# Patient Record
Sex: FEMALE | Race: WHITE | Employment: UNEMPLOYED | URBAN - METROPOLITAN AREA
[De-identification: names, ages, dates, MRNs, and addresses within clinical notes are randomized per-mention and may not be internally consistent; named-entity substitution may affect disease eponyms.]

---

## 2018-09-20 ENCOUNTER — HOSPITAL ENCOUNTER (OUTPATIENT)
Dept: CARDIAC CATH/INVASIVE PROCEDURES | Age: 58
Discharge: HOME | End: 2018-09-20
Attending: INTERNAL MEDICINE | Admitting: INTERNAL MEDICINE
Payer: COMMERCIAL

## 2018-09-20 VITALS
OXYGEN SATURATION: 96 % | WEIGHT: 105 LBS | BODY MASS INDEX: 19.83 KG/M2 | RESPIRATION RATE: 18 BRPM | DIASTOLIC BLOOD PRESSURE: 61 MMHG | TEMPERATURE: 98.1 F | HEART RATE: 73 BPM | SYSTOLIC BLOOD PRESSURE: 112 MMHG | HEIGHT: 61 IN

## 2018-09-20 LAB
ANION GAP SERPL CALC-SCNC: 8 MMOL/L (ref 7–16)
ATRIAL RATE: 85 BPM
BUN SERPL-MCNC: 11 MG/DL (ref 6–23)
CALCIUM SERPL-MCNC: 9.4 MG/DL (ref 8.3–10.4)
CALCULATED P AXIS, ECG09: 54 DEGREES
CALCULATED R AXIS, ECG10: 19 DEGREES
CALCULATED T AXIS, ECG11: 26 DEGREES
CHLORIDE SERPL-SCNC: 104 MMOL/L (ref 98–107)
CO2 SERPL-SCNC: 29 MMOL/L (ref 21–32)
CREAT SERPL-MCNC: 0.57 MG/DL (ref 0.6–1)
DIAGNOSIS, 93000: NORMAL
ERYTHROCYTE [DISTWIDTH] IN BLOOD BY AUTOMATED COUNT: 12.6 %
GLUCOSE SERPL-MCNC: 160 MG/DL (ref 65–100)
HCT VFR BLD AUTO: 47.1 % (ref 35.8–46.3)
HGB BLD-MCNC: 16.3 G/DL (ref 11.7–15.4)
INR PPP: 1.1
MAGNESIUM SERPL-MCNC: 1.8 MG/DL (ref 1.8–2.4)
MCH RBC QN AUTO: 31.2 PG (ref 26.1–32.9)
MCHC RBC AUTO-ENTMCNC: 34.6 G/DL (ref 31.4–35)
MCV RBC AUTO: 90.2 FL (ref 79.6–97.8)
NRBC # BLD: 0 K/UL (ref 0–0.2)
P-R INTERVAL, ECG05: 152 MS
PLATELET # BLD AUTO: 196 K/UL (ref 150–450)
PMV BLD AUTO: 11.2 FL (ref 9.4–12.3)
POTASSIUM SERPL-SCNC: 3.6 MMOL/L (ref 3.5–5.1)
PROTHROMBIN TIME: 13.4 SEC (ref 11.5–14.5)
Q-T INTERVAL, ECG07: 358 MS
QRS DURATION, ECG06: 74 MS
QTC CALCULATION (BEZET), ECG08: 426 MS
RBC # BLD AUTO: 5.22 M/UL (ref 4.05–5.2)
SODIUM SERPL-SCNC: 141 MMOL/L (ref 136–145)
VENTRICULAR RATE, ECG03: 85 BPM
WBC # BLD AUTO: 8.3 K/UL (ref 4.3–11.1)

## 2018-09-20 PROCEDURE — 99152 MOD SED SAME PHYS/QHP 5/>YRS: CPT

## 2018-09-20 PROCEDURE — 77030019569 HC BND COMPR RAD TERU -B

## 2018-09-20 PROCEDURE — 80048 BASIC METABOLIC PNL TOTAL CA: CPT

## 2018-09-20 PROCEDURE — 99153 MOD SED SAME PHYS/QHP EA: CPT

## 2018-09-20 PROCEDURE — 74011000258 HC RX REV CODE- 258: Performed by: INTERNAL MEDICINE

## 2018-09-20 PROCEDURE — C1887 CATHETER, GUIDING: HCPCS

## 2018-09-20 PROCEDURE — 74011636320 HC RX REV CODE- 636/320: Performed by: INTERNAL MEDICINE

## 2018-09-20 PROCEDURE — 74011250636 HC RX REV CODE- 250/636: Performed by: INTERNAL MEDICINE

## 2018-09-20 PROCEDURE — 93005 ELECTROCARDIOGRAM TRACING: CPT | Performed by: INTERNAL MEDICINE

## 2018-09-20 PROCEDURE — 85610 PROTHROMBIN TIME: CPT

## 2018-09-20 PROCEDURE — 77030004534 HC CATH ANGI DX INFN CARD -A

## 2018-09-20 PROCEDURE — 36252 INS CATH REN ART 1ST BILAT: CPT

## 2018-09-20 PROCEDURE — C1894 INTRO/SHEATH, NON-LASER: HCPCS

## 2018-09-20 PROCEDURE — C1769 GUIDE WIRE: HCPCS

## 2018-09-20 PROCEDURE — 93571 IV DOP VEL&/PRESS C FLO 1ST: CPT

## 2018-09-20 PROCEDURE — 85027 COMPLETE CBC AUTOMATED: CPT

## 2018-09-20 PROCEDURE — 83735 ASSAY OF MAGNESIUM: CPT

## 2018-09-20 PROCEDURE — 93458 L HRT ARTERY/VENTRICLE ANGIO: CPT

## 2018-09-20 PROCEDURE — 77030015766

## 2018-09-20 PROCEDURE — 74011250636 HC RX REV CODE- 250/636

## 2018-09-20 PROCEDURE — 74011000250 HC RX REV CODE- 250: Performed by: INTERNAL MEDICINE

## 2018-09-20 PROCEDURE — 74011250637 HC RX REV CODE- 250/637: Performed by: INTERNAL MEDICINE

## 2018-09-20 PROCEDURE — 77030012468 HC VLV BLEEDBK CNTRL ABBT -B

## 2018-09-20 RX ORDER — MIDAZOLAM HYDROCHLORIDE 1 MG/ML
.5-5 INJECTION, SOLUTION INTRAMUSCULAR; INTRAVENOUS
Status: DISCONTINUED | OUTPATIENT
Start: 2018-09-20 | End: 2018-09-20 | Stop reason: HOSPADM

## 2018-09-20 RX ORDER — DIAZEPAM 5 MG/1
5 TABLET ORAL ONCE
Status: COMPLETED | OUTPATIENT
Start: 2018-09-20 | End: 2018-09-20

## 2018-09-20 RX ORDER — LIDOCAINE HYDROCHLORIDE 10 MG/ML
5-40 INJECTION INFILTRATION; PERINEURAL
Status: DISCONTINUED | OUTPATIENT
Start: 2018-09-20 | End: 2018-09-20 | Stop reason: HOSPADM

## 2018-09-20 RX ORDER — METOPROLOL TARTRATE 5 MG/5ML
2.5-5 INJECTION INTRAVENOUS
Status: DISCONTINUED | OUTPATIENT
Start: 2018-09-20 | End: 2018-09-20 | Stop reason: HOSPADM

## 2018-09-20 RX ORDER — AMLODIPINE BESYLATE 10 MG/1
10 TABLET ORAL DAILY
Qty: 90 TAB | Refills: 3 | Status: SHIPPED | OUTPATIENT
Start: 2018-09-20 | End: 2022-01-05 | Stop reason: SDUPTHER

## 2018-09-20 RX ORDER — SODIUM CHLORIDE 9 MG/ML
75 INJECTION, SOLUTION INTRAVENOUS CONTINUOUS
Status: DISCONTINUED | OUTPATIENT
Start: 2018-09-20 | End: 2018-09-20 | Stop reason: HOSPADM

## 2018-09-20 RX ORDER — HEPARIN SODIUM 200 [USP'U]/100ML
3 INJECTION, SOLUTION INTRAVENOUS CONTINUOUS
Status: DISCONTINUED | OUTPATIENT
Start: 2018-09-20 | End: 2018-09-20 | Stop reason: HOSPADM

## 2018-09-20 RX ORDER — SODIUM CHLORIDE 0.9 % (FLUSH) 0.9 %
5-10 SYRINGE (ML) INJECTION AS NEEDED
Status: DISCONTINUED | OUTPATIENT
Start: 2018-09-20 | End: 2018-09-20 | Stop reason: HOSPADM

## 2018-09-20 RX ORDER — FENTANYL CITRATE 50 UG/ML
25-100 INJECTION, SOLUTION INTRAMUSCULAR; INTRAVENOUS
Status: DISCONTINUED | OUTPATIENT
Start: 2018-09-20 | End: 2018-09-20 | Stop reason: HOSPADM

## 2018-09-20 RX ORDER — GUAIFENESIN 100 MG/5ML
324 LIQUID (ML) ORAL ONCE
Status: DISCONTINUED | OUTPATIENT
Start: 2018-09-20 | End: 2018-09-20 | Stop reason: HOSPADM

## 2018-09-20 RX ORDER — SODIUM CHLORIDE 0.9 % (FLUSH) 0.9 %
5-10 SYRINGE (ML) INJECTION EVERY 8 HOURS
Status: DISCONTINUED | OUTPATIENT
Start: 2018-09-20 | End: 2018-09-20 | Stop reason: HOSPADM

## 2018-09-20 RX ORDER — SODIUM CHLORIDE 9 MG/ML
250 INJECTION, SOLUTION INTRAVENOUS CONTINUOUS
Status: DISCONTINUED | OUTPATIENT
Start: 2018-09-20 | End: 2018-09-20 | Stop reason: HOSPADM

## 2018-09-20 RX ADMIN — LIDOCAINE HYDROCHLORIDE 5 ML: 10 INJECTION, SOLUTION INFILTRATION; PERINEURAL at 17:05

## 2018-09-20 RX ADMIN — MIDAZOLAM HYDROCHLORIDE 1 MG: 1 INJECTION, SOLUTION INTRAMUSCULAR; INTRAVENOUS at 16:52

## 2018-09-20 RX ADMIN — HEPARIN SODIUM 2 ML: 10000 INJECTION, SOLUTION INTRAVENOUS; SUBCUTANEOUS at 17:08

## 2018-09-20 RX ADMIN — SODIUM CHLORIDE 75 ML/HR: 900 INJECTION, SOLUTION INTRAVENOUS at 14:08

## 2018-09-20 RX ADMIN — DIAZEPAM 5 MG: 5 TABLET ORAL at 14:08

## 2018-09-20 RX ADMIN — METOROPROLOL TARTRATE 5 MG: 5 INJECTION, SOLUTION INTRAVENOUS at 16:56

## 2018-09-20 RX ADMIN — IOPAMIDOL 114 ML: 755 INJECTION, SOLUTION INTRAVENOUS at 17:14

## 2018-09-20 RX ADMIN — HEPARIN SODIUM 3 ML/HR: 5000 INJECTION, SOLUTION INTRAVENOUS; SUBCUTANEOUS at 17:05

## 2018-09-20 RX ADMIN — FENTANYL CITRATE 25 MCG: 50 INJECTION, SOLUTION INTRAMUSCULAR; INTRAVENOUS at 16:52

## 2018-09-20 RX ADMIN — BIVALIRUDIN 1.75 MG/KG/HR: 250 INJECTION, POWDER, LYOPHILIZED, FOR SOLUTION INTRAVENOUS at 17:02

## 2018-09-20 NOTE — PROCEDURES
Brief Cardiac Procedure Note    Patient: Casandra Hoyt MRN: [id number]  SSN: [id number]    YOB: 1960  Age: 56 y.o. Sex: female      Date of Procedure: 9/20/2018     Pre-procedure Diagnosis: Atypical Angina    Post-procedure Diagnosis: Coronary Artery Disease    Procedure: Left Heart Catheterization    Brief Description of Procedure: See note    Performed By: Christiano Mata MD     Assistants: None    Anesthesia: Moderate Sedation    Estimated Blood Loss: Less than 10 mL      Specimens: None    Implants: None    Findings:   LV:  EF 65%  LM:  NML  LAD:  Widely patent stent, 30-40% D1  LCx:  Stent patent with 30-50% at prx and distal edge  RCA:  Long 20-30% mid    iFR LCx 0.97    Bilateral renal injection - mild focal FMD main right, left main normal, left inferior 68-45%,     Complications: None    Recommendations: Continue medical therapy.     Signed By: Christiano Mata MD     September 20, 2018

## 2018-09-20 NOTE — PROGRESS NOTES
Called to pre-assess for C poss with DR Adia Oswald , Scheduled 9/20/18.  No answer & mailbox not set up, unable to leave message

## 2018-09-20 NOTE — PROGRESS NOTES
Report received from [facility] [address]. Procedural findings communicated. Intra procedural  medication administration reviewed. Progression of care discussed.      Patient received into [address] post sheath removal.     Right Radial access site without bleeding or swelling     TR band dry and intact     Patient instructed to limit movement to right upper extremity    Routine post procedural vital signs and site assessment initiated

## 2018-09-20 NOTE — PROCEDURES
[address] [facility] Jonathan Warner  MR#: [id number]  : 1960  ACCOUNT #: [de-identified]   DATE OF SERVICE: 2018    PRIMARY CARDIOLOGIST:  Austin Hartman MD    PRIMARY CARE PHYSICIAN:  Cece Warren DO    BRIEF HISTORY:  The patient is a 81-year-old female with extensive tobacco abuse, established atherosclerotic coronary artery disease, prior PCI and stenting of the LAD and left circumflex, who presents now with worsening symptoms consistent with angina despite medical management and referred for cardiac catheterization. PROCEDURE:  After informed consent, she was prepped and draped in the usual sterile fashion. The right wrist was infiltrated with lidocaine. The right radial artery was accessed and a 6-Welsh sheath was advanced. A 5-Welsh Tiger catheter was utilized for left and right coronary injections and a 5-Welsh angled pigtail for left ventriculography. A 5-Welsh multipurpose catheter for bilateral selective renal angiography. A 6-Welsh XB 3.0 guide was utilized for iFR in the left circumflex. CONTRAST:  Isovue. CONSCIOUS SEDATION:  Start time 18, end time 26. Medications:  1 mg of Versed and 25 mcg of fentanyl. MONITORING RN:  Nadir Ugarte. FINDINGS:  1. Left ventricle:  Normal left ventricular size, normal left ventricular systolic function, ejection fraction is 65%. There is no significant mitral regurgitation. There is no aortic valve gradient. Left ventricular end-diastolic pressure measured at 10 mmHg. 2.  Left main:  Left main is large, bifurcates into the LAD and circumflex systems, and appears angiographically normal.  3.  Left anterior descending coronary artery: This is a large vessel. The stent in the mid portion is widely patent. There is no other significant disease within the LAD. There is a small first diagonal which has diffuse 30-40% stenosis. 4.  Left circumflex coronary artery:   This is a moderate-sized vessel. It gives rise to a very small first obtuse marginal that has 30-40% ostial stenosis. The stent is a very short stent. It is caught in a highly mobile section of the mid left circumflex. There is a 30-40% lesion at the proximal stent edge and a 40-50% lesion at the distal stent edge. The terminal obtuse marginal appears normal.  5.  Right coronary artery: This is a moderate-sized anatomically dominant vessel. It has a long area of 30% stenosis throughout the mid. It becomes quite large and gives rise to a large posterior descending and posterolateral branch that appear normal.    IFR:    LESION:  Left circumflex. RESULT:  0.97. DETAILS:  The patient was anticoagulated with Angiomax. A 6-S[place] Arabian XB 3.0 guide was utilized. An iFR wire was advanced to the distal edge of the guide, normalized, and advanced distal to the lesion. IFR was measured at 0.97 and 1.0, all consistent with non-hemodynamically significant stenosis which is best managed medically. BILATERAL RENAL ANGIOGRAPHY:   1. Right renal artery: This exists as 1 large renal artery. There is a small superior branch that comes off near the aorta ostium. The mid portion of the main vessel has mild focal fibromuscular dysplastic changes with normal flow. 2.  Left renal artery:  There is a single superior renal artery which supplies the vast majority of the left renal, which appears angiographically normal.  There is a smaller separate inferior renal artery supplying the inferior pole of the left kidney that has mild 20-30% stenosis. Successful hemostasis with a pneumatic radial band. CONCLUSIONS:  1. Normal left ventricular systolic function with normal filling pressures. 2.  Severe, poorly controlled chronic hypertension with baseline pressure of 178/114, was administered 10 mg of IV Lopressor with marked improvement in blood pressure.   3.  Stable atherosclerotic coronary artery disease with widely patent LAD stenting, mild nonobstructive disease involving the small first diagonal.  4.  Patent left circumflex stenting with mild-to-moderate disease on proximal and distal stent edge with iFR of 0.97 and 1.0.  5.  Long area of mild nonobstructive disease of the mid right coronary artery consistent with heart catheterization from 3 years ago. 6.  No evidence of significant renal artery stenosis or fibromuscular dysplasia. Thank you for allowing us to participate in the care of this patient. If you have any questions or concerns, please feel free to contact me.       MD AKIL Aguilera / DN  D: 09/20/2018 17:26     T: 09/20/2018 17:56  JOB #: [id number]  CC: Kirti Price MD  [facility] CARDIOLOGY [address] [place] [address] Dr Robbin Alvarez, [address] [facility]  CC[facility] [address]  [address]   [address]

## 2018-09-20 NOTE — PROGRESS NOTES
TRANSFER - OUT REPORT:    Verbal report given to Dhaval Wright RN(name) on Ese Earl  being transferred to cpru(unit) for routine progression of care       Report consisted of patients Situation, Background, Assessment and   Recommendations(SBAR). Information from the following report(s) SBAR was reviewed with the receiving nurse.    has No Known Allergies. Opportunity for questions and clarification was provided. Procedure Summary:Pt had LHC with iFR of circ via R wrist. Site sealed with R band using 10 ml at 1717 hrs.   Med Administration    Versed:  1 mg  Fentanyl: 25 mcg    Angiomax Stop Time: 2493    Visit Vitals    /71 (BP 1 Location: Left arm, BP Patient Position: Supine)    Pulse 73    Temp 98.1 °F (36.7 °C)    Resp 18    Ht 5' 1\" (1.549 m)    Wt 47.6 kg (105 lb)    SpO2 96%    BMI 19.84 kg/m2     Past Medical History:   Diagnosis Date    Atherosclerosis of native coronary artery without angina pectoris 5/13/2016    Bilateral carotid artery occlusion 5/13/2016    CAD (coronary artery disease) 3/27/2015    Carotid artery stenosis without cerebral infarction 5/13/2016    Chest pain, unspecified 5/13/2016    Chronic obstructive pulmonary disease (HCC)     Coronary atherosclerosis of native coronary vessel 7/25/2016    Depression (emotion)     Diabetes ([address]     ARANDA (dyspnea on exertion)     Dyspnea 5/13/2016    GERD (gastroesophageal reflux disease)     Hypertension     Mixed hyperlipidemia 5/13/2016    Occlusion and stenosis of bilateral carotid arteries 7/25/2016    Peripheral vascular disease ([address] 5/13/2016    SOB (shortness of breath)     Tobacco dependence            Peripheral IV 09/20/18 Right Antecubital (Active)       Peripheral IV 09/20/18 Left Antecubital (Active)

## 2018-09-20 NOTE — PROGRESS NOTES
Pt arrived, ambulated to room with no visible problems, planned C for Dr Solange Davis. Consent signed, Procedure discussed with pt all questions answered voiced understanding. Medications and history discussed with pt. Pt prepped per ordersThe patient has a fraility score of 3-MANAGING WELL, based on ability to complete ADLs without assistance, increased symptoms on exertion.       Patient took Aspirin 324mg today at 0900 prior to arrival.  Pt took 75mg Plavix at 1400

## 2018-09-20 NOTE — PROGRESS NOTES
Patient up to bedside, vital signs and site stable. Patient ambulated to bathroom without difficulty. Patient voided without difficulty. Vascular site stable. 1845 Discharge instructions and home medications reviewed with patient. Time allowed for questions and answers. 1900 Patient ambulated second time without difficulty. Site stable after ambulation. Peripheral IV sites dc'd without difficulty with tips intact. 1915 Patient discharged to home with family.

## 2018-09-20 NOTE — DISCHARGE INSTRUCTIONS

## 2018-09-20 NOTE — PROGRESS NOTES
TRANSFER - IN REPORT:    Verbal report received from [place], RN(name) on Lorelei Ugarte  being received from cath lab(unit) for routine progression of care      Report consisted of patients Situation, Background, Assessment and   Recommendations(SBAR). Information from the following report(s) Procedure Summary was reviewed with the receiving nurse. Opportunity for questions and clarification was provided. Assessment completed upon patients arrival to unit and care assumed.

## 2018-09-20 NOTE — IP AVS SNAPSHOT
[facility] 
 
 
 [address] [place] [address] 
[phone] Patient: Fabrizio Giron MRN: [id number] TQA:17/4/3446 Discharge Summary 9/20/2018 Fabrizio Giron MRN[de-identified]  [id number] Admission Information Provider Pager Service Admission Date Expected D/C Date Les Cortes MD  CARDIAC CATH LAB 9/20/2018 9/20/2018 Actual LOS Patient Class 0 days OUTPATIENT Follow-up Information Follow up With Details Comments Contact Info [address] [facility] [address] 
[phone] Benjamín Martinez MD Schedule an appointment as soon as possible for a visit in 2 weeks for heart cath follow-up  [employer] [address] [place] [address] 
[phone] My Medications TAKE these medications as instructed Instructions Each Dose to Equal  
 Morning Noon Evening Bedtime  
 amLODIPine 10 mg tablet Commonly known as:  Brigitte Bustos Your last dose was: Your next dose is: Take 1 Tab by mouth daily. 10 mg ASK your physician about these medications Instructions Each Dose to Equal  
 Morning Noon Evening Bedtime ADVAIR DISKUS 250-50 mcg/dose diskus inhaler Generic drug:  fluticasone-salmeterol Your last dose was: Your next dose is: Take 1 Puff by inhalation every twelve (12) hours. 1 Puff  
    
   
   
   
  
 albuterol 90 mcg/actuation inhaler Commonly known as:  PROVENTIL HFA, VENTOLIN HFA, PROAIR HFA Your last dose was: Your next dose is: Take  by inhalation every six (6) hours as needed for Wheezing. Indications: CHRONIC OBSTRUCTIVE PULMONARY DISEASE  
     
   
   
   
  
 aspirin 81 mg chewable tablet Your last dose was: Your next dose is: Take 81 mg by mouth daily. Indications: MYOCARDIAL INFARCTION PREVENTION  81 mg  
    
   
 atorvastatin 40 mg tablet Commonly known as:  LIPITOR Your last dose was: Your next dose is: Take 1 Tab by mouth daily. 40 mg  
    
   
   
   
  
 benazepril 40 mg tablet Commonly known as:  LOTENSIN Your last dose was: Your next dose is: Take 40 mg by mouth daily. Indications: HYPERTENSION 40 mg  
    
   
   
   
  
 carvedilol 25 mg tablet Commonly known as:  Lana Chew Your last dose was: Your next dose is: Take 25 mg by mouth two (2) times daily (with meals). Indications: HYPERTENSION  
 25 mg  
    
   
   
   
  
 clopidogrel 75 mg Tab Commonly known as:  PLAVIX Your last dose was: Your next dose is: Take 1 Tab by mouth daily. 75 mg  
    
   
   
   
  
 IMDUR 60 mg CR tablet Generic drug:  isosorbide mononitrate ER Your last dose was: Your next dose is: Take 60 mg by mouth every morning. Indications: Pt takes as needed 60 mg  
    
   
   
   
  
 metFORMIN 500 mg tablet Commonly known as:  GLUCOPHAGE Your last dose was: Your next dose is: Take 1,000 mg by mouth two (2) times daily (with meals). 1000 mg  
    
   
   
   
  
 nitroglycerin 0.4 mg SL tablet Commonly known as:  NITROSTAT Your last dose was: Your next dose is:    
   
   
 1 Tab by SubLINGual route every five (5) minutes as needed for Chest Pain. 0.4 mg  
    
   
   
   
  
 XOPENEX HFA 45 mcg/actuation inhaler Generic drug:  levalbuterol tartrate Your last dose was: Your next dose is: Take 2 Puffs by inhalation every six (6) hours as needed for Wheezing. Indications: BRONCHOSPASM PREVENTION  
 2 Puff Where to Get Your Medications These medications were sent to [facility], [address]  [facility] [facility], [address] [place] [address] Phone:  [phone]  
  amLODIPine 10 mg tablet General Information Please provide this summary of care documentation to your next provider. Allergies No Known Allergies Current Immunizations  Reviewed on 3/27/2015 No immunizations on file. Discharge Instructions Discharge Instructions HEART CATHETERIZATION/ANGIOGRAPHY DISCHARGE INSTRUCTIONS 1. Check puncture site frequently for swelling or bleeding. If there is any bleeding, apply pressure over the area with a clean towel or washcloth. If you are unable to stop the bleeding in 15-20 minutes call 911. Notify your doctor for any redness, swelling, drainage, or oozing from the puncture site. Notify your doctor for any fever, chills or other signs of infection. 2. If the extremity becomes cold, numb, or painful call [facility] Cardiology at [phone]. 
3. Activity should be limited for the next 48 hours. Avoid pushing, pulling, or strenuous activity for 48 hours. No heavy lifting (anything over 5 pounds) for 3 days. No driving for 48 hours. 4. You may resume your usual diet. Drink more fluids than usual, water is best. 
5. Have a responsible person drive you home and stay with you for at least 24 hours after your heart catheterization/angiography. 6. You may remove bandage from your right wrist in 24 hours. You may shower in 24 hours. No tub baths, hot tubs, or swimming for 1 week. Do not wash dishes for 1 week. Do not place any lotions, creams, powders, or ointments over puncture site for 1 week. You may place a clean band-aid over the puncture site each day for 5 days. Change daily. I have read the above instructions and have had the opportunity to ask questions. Discharge Orders None  
  
` Patient Signature:  ____________________________________________________________  Date:  ____________________________________________________________  
  
 Leora Willoughby    
 Provider Signature:  ____________________________________________________________ Date:  ____________________________________________________________

## 2019-02-25 ENCOUNTER — HOSPITAL ENCOUNTER (OUTPATIENT)
Dept: CT IMAGING | Age: 59
Discharge: HOME | End: 2019-02-25
Attending: INTERNAL MEDICINE

## 2019-02-25 DIAGNOSIS — I73.9 CLAUDICATION (HCC): ICD-10-CM

## 2019-02-25 RX ORDER — SODIUM CHLORIDE 0.9 % (FLUSH) 0.9 %
10 SYRINGE (ML) INJECTION
Status: COMPLETED | OUTPATIENT
Start: 2019-02-25 | End: 2019-02-25

## 2019-02-25 RX ADMIN — Medication 10 ML: at 13:43

## 2019-03-04 ENCOUNTER — HOSPITAL ENCOUNTER (OUTPATIENT)
Dept: SURGERY | Age: 59
Discharge: HOME | DRG: 254 | End: 2019-03-04
Payer: COMMERCIAL

## 2019-03-04 VITALS
SYSTOLIC BLOOD PRESSURE: 145 MMHG | HEART RATE: 80 BPM | OXYGEN SATURATION: 97 % | DIASTOLIC BLOOD PRESSURE: 76 MMHG | RESPIRATION RATE: 18 BRPM | TEMPERATURE: 98.2 F | WEIGHT: 105 LBS | HEIGHT: 61 IN | BODY MASS INDEX: 19.83 KG/M2

## 2019-03-04 LAB
ANION GAP SERPL CALC-SCNC: 10 MMOL/L (ref 7–16)
BUN SERPL-MCNC: 11 MG/DL (ref 6–23)
CALCIUM SERPL-MCNC: 9.5 MG/DL (ref 8.3–10.4)
CHLORIDE SERPL-SCNC: 101 MMOL/L (ref 98–107)
CO2 SERPL-SCNC: 31 MMOL/L (ref 21–32)
CREAT SERPL-MCNC: 0.63 MG/DL (ref 0.6–1)
GLUCOSE BLD STRIP.AUTO-MCNC: 215 MG/DL (ref 65–100)
GLUCOSE SERPL-MCNC: 233 MG/DL (ref 65–100)
HGB BLD-MCNC: 15.4 G/DL (ref 11.7–15.4)
POTASSIUM SERPL-SCNC: 3 MMOL/L (ref 3.5–5.1)
SODIUM SERPL-SCNC: 142 MMOL/L (ref 136–145)

## 2019-03-04 PROCEDURE — 80048 BASIC METABOLIC PNL TOTAL CA: CPT

## 2019-03-04 PROCEDURE — 85018 HEMOGLOBIN: CPT

## 2019-03-04 PROCEDURE — 82962 GLUCOSE BLOOD TEST: CPT

## 2019-03-04 RX ORDER — RANITIDINE 150 MG/1
150 TABLET, FILM COATED ORAL 2 TIMES DAILY
COMMUNITY
End: 2022-01-05

## 2019-03-04 NOTE — PERIOP NOTES
Patient verified name and . Patient provided medical/health information and PTA medications to the best of their ability. TYPE  CASE:2  Order for consent yes found in EHR and matches case posting. Labs per surgeon:bmp. Results: -  Labs per anesthesia protocol: hemoglobin,poc glucose. Results -  EKG  :      Patient provided with and instructed on education handouts including Guide to Surgery, blood transfusions, pain management, and hand hygiene for the family and community, and [facility] brochure. Coleen mist and instructions given per hospital policy. Instructed patient to continue previous medications as prescribed prior to surgery unless otherwise directed and to take the following medications the day of surgery according to anesthesia guidelines : advair,xopanex as needed,albuterol as needed,coreg,aspirin,amlodipine,zantac . Instructed patient to hold  the following medications: none. Original medication prescription bottles some visualized during patient appointment. Patient teach back successful and patient demonstrates knowledge of instruction.

## 2019-03-05 ENCOUNTER — ANESTHESIA EVENT (OUTPATIENT)
Dept: SURGERY | Age: 59
DRG: 254 | End: 2019-03-05
Payer: COMMERCIAL

## 2019-03-06 ENCOUNTER — ANESTHESIA (OUTPATIENT)
Dept: SURGERY | Age: 59
DRG: 254 | End: 2019-03-06
Payer: COMMERCIAL

## 2019-03-06 ENCOUNTER — HOSPITAL ENCOUNTER (INPATIENT)
Age: 59
LOS: 1 days | Discharge: HOME | DRG: 254 | End: 2019-03-07
Attending: SURGERY | Admitting: SURGERY
Payer: COMMERCIAL

## 2019-03-06 ENCOUNTER — APPOINTMENT (OUTPATIENT)
Dept: INTERVENTIONAL RADIOLOGY/VASCULAR | Age: 59
DRG: 254 | End: 2019-03-06
Attending: SURGERY
Payer: COMMERCIAL

## 2019-03-06 PROBLEM — I73.9 INTERMITTENT CLAUDICATION (HCC): Status: ACTIVE | Noted: 2019-03-06

## 2019-03-06 LAB — GLUCOSE BLD STRIP.AUTO-MCNC: 165 MG/DL (ref 65–100)

## 2019-03-06 PROCEDURE — C1894 INTRO/SHEATH, NON-LASER: HCPCS

## 2019-03-06 PROCEDURE — C1769 GUIDE WIRE: HCPCS

## 2019-03-06 PROCEDURE — 82962 GLUCOSE BLOOD TEST: CPT

## 2019-03-06 PROCEDURE — B41F1ZZ FLUOROSCOPY OF RIGHT LOWER EXTREMITY ARTERIES USING LOW OSMOLAR CONTRAST: ICD-10-PCS | Performed by: SURGERY

## 2019-03-06 PROCEDURE — 74011250636 HC RX REV CODE- 250/636: Performed by: SURGERY

## 2019-03-06 PROCEDURE — 76060000036 HC ANESTHESIA 2.5 TO 3 HR: Performed by: SURGERY

## 2019-03-06 PROCEDURE — 74011000250 HC RX REV CODE- 250: Performed by: ANESTHESIOLOGY

## 2019-03-06 PROCEDURE — 74011000250 HC RX REV CODE- 250

## 2019-03-06 PROCEDURE — 76010000132 HC OR TIME 2.5 TO 3 HR: Performed by: SURGERY

## 2019-03-06 PROCEDURE — 94760 N-INVAS EAR/PLS OXIMETRY 1: CPT

## 2019-03-06 PROCEDURE — 047D3DZ DILATION OF LEFT COMMON ILIAC ARTERY WITH INTRALUMINAL DEVICE, PERCUTANEOUS APPROACH: ICD-10-PCS | Performed by: SURGERY

## 2019-03-06 PROCEDURE — C1887 CATHETER, GUIDING: HCPCS

## 2019-03-06 PROCEDURE — 77010033678 HC OXYGEN DAILY

## 2019-03-06 PROCEDURE — 74011250636 HC RX REV CODE- 250/636: Performed by: ANESTHESIOLOGY

## 2019-03-06 PROCEDURE — B41G1ZZ FLUOROSCOPY OF LEFT LOWER EXTREMITY ARTERIES USING LOW OSMOLAR CONTRAST: ICD-10-PCS | Performed by: SURGERY

## 2019-03-06 PROCEDURE — 77030039425 HC BLD LARYNG TRULITE DISP TELE -A: Performed by: ANESTHESIOLOGY

## 2019-03-06 PROCEDURE — 74011250636 HC RX REV CODE- 250/636

## 2019-03-06 PROCEDURE — C1874 STENT, COATED/COV W/DEL SYS: HCPCS | Performed by: SURGERY

## 2019-03-06 PROCEDURE — 94640 AIRWAY INHALATION TREATMENT: CPT

## 2019-03-06 PROCEDURE — 74011000250 HC RX REV CODE- 250: Performed by: SURGERY

## 2019-03-06 PROCEDURE — 77030037088 HC TUBE ENDOTRACH ORAL NSL COVD-A: Performed by: ANESTHESIOLOGY

## 2019-03-06 PROCEDURE — 74011636320 HC RX REV CODE- 636/320: Performed by: SURGERY

## 2019-03-06 PROCEDURE — 76937 US GUIDE VASCULAR ACCESS: CPT

## 2019-03-06 PROCEDURE — 04703DZ DILATION OF ABDOMINAL AORTA WITH INTRALUMINAL DEVICE, PERCUTANEOUS APPROACH: ICD-10-PCS | Performed by: SURGERY

## 2019-03-06 PROCEDURE — 76210000006 HC OR PH I REC 0.5 TO 1 HR: Performed by: SURGERY

## 2019-03-06 PROCEDURE — 65660000004 HC RM CVT STEPDOWN

## 2019-03-06 PROCEDURE — 77030021532 HC CATH ANGI DX IMPRS MRTM -B

## 2019-03-06 PROCEDURE — 77030020782 HC GWN BAIR PAWS FLX 3M -B: Performed by: ANESTHESIOLOGY

## 2019-03-06 PROCEDURE — 77030013519 HC DEV INFL BASIX MRTM -B

## 2019-03-06 PROCEDURE — C1725 CATH, TRANSLUMIN NON-LASER: HCPCS

## 2019-03-06 PROCEDURE — B4101ZZ FLUOROSCOPY OF ABDOMINAL AORTA USING LOW OSMOLAR CONTRAST: ICD-10-PCS | Performed by: SURGERY

## 2019-03-06 PROCEDURE — 74011250637 HC RX REV CODE- 250/637: Performed by: SURGERY

## 2019-03-06 PROCEDURE — 37236 OPEN/PERQ PLACE STENT 1ST: CPT

## 2019-03-06 PROCEDURE — 047C3DZ DILATION OF RIGHT COMMON ILIAC ARTERY WITH INTRALUMINAL DEVICE, PERCUTANEOUS APPROACH: ICD-10-PCS | Performed by: SURGERY

## 2019-03-06 DEVICE — VIABAHN BX BALLOON EXP ENDO 8MMX59MM 8FR 135CMCATH HEPARIN
Type: IMPLANTABLE DEVICE | Site: AORTA | Status: FUNCTIONAL
Brand: GORE VIABAHN VBX BALLOON EXPANDABLE ENDO

## 2019-03-06 DEVICE — VIABAHN BX BALLOON EXP ENDO 6MMX59MM 7FR 135CMCATH HEPARIN
Type: IMPLANTABLE DEVICE | Site: GROIN | Status: FUNCTIONAL
Brand: GORE VIABAHN VBX BALLOON EXPANDABLE ENDO

## 2019-03-06 RX ORDER — ALBUTEROL SULFATE 0.83 MG/ML
2.5 SOLUTION RESPIRATORY (INHALATION)
Status: DISCONTINUED | OUTPATIENT
Start: 2019-03-06 | End: 2019-03-07 | Stop reason: HOSPADM

## 2019-03-06 RX ORDER — LIDOCAINE HYDROCHLORIDE 10 MG/ML
0.1 INJECTION INFILTRATION; PERINEURAL AS NEEDED
Status: DISCONTINUED | OUTPATIENT
Start: 2019-03-06 | End: 2019-03-07 | Stop reason: HOSPADM

## 2019-03-06 RX ORDER — PROTAMINE SULFATE 10 MG/ML
INJECTION, SOLUTION INTRAVENOUS AS NEEDED
Status: DISCONTINUED | OUTPATIENT
Start: 2019-03-06 | End: 2019-03-06 | Stop reason: HOSPADM

## 2019-03-06 RX ORDER — ROCURONIUM BROMIDE 10 MG/ML
INJECTION, SOLUTION INTRAVENOUS AS NEEDED
Status: DISCONTINUED | OUTPATIENT
Start: 2019-03-06 | End: 2019-03-06 | Stop reason: HOSPADM

## 2019-03-06 RX ORDER — MIDAZOLAM HYDROCHLORIDE 1 MG/ML
2 INJECTION, SOLUTION INTRAMUSCULAR; INTRAVENOUS
Status: ACTIVE | OUTPATIENT
Start: 2019-03-06 | End: 2019-03-07

## 2019-03-06 RX ORDER — PROPOFOL 10 MG/ML
INJECTION, EMULSION INTRAVENOUS AS NEEDED
Status: DISCONTINUED | OUTPATIENT
Start: 2019-03-06 | End: 2019-03-06 | Stop reason: HOSPADM

## 2019-03-06 RX ORDER — CEFAZOLIN SODIUM/WATER 2 G/20 ML
2 SYRINGE (ML) INTRAVENOUS ONCE
Status: COMPLETED | OUTPATIENT
Start: 2019-03-06 | End: 2019-03-06

## 2019-03-06 RX ORDER — OXYCODONE HYDROCHLORIDE 5 MG/1
5 TABLET ORAL
Status: DISCONTINUED | OUTPATIENT
Start: 2019-03-06 | End: 2019-03-06 | Stop reason: HOSPADM

## 2019-03-06 RX ORDER — GUAIFENESIN 100 MG/5ML
81 LIQUID (ML) ORAL DAILY
Status: DISCONTINUED | OUTPATIENT
Start: 2019-03-07 | End: 2019-03-07 | Stop reason: HOSPADM

## 2019-03-06 RX ORDER — MIDAZOLAM HYDROCHLORIDE 1 MG/ML
1 INJECTION, SOLUTION INTRAMUSCULAR; INTRAVENOUS
Status: DISCONTINUED | OUTPATIENT
Start: 2019-03-06 | End: 2019-03-07 | Stop reason: HOSPADM

## 2019-03-06 RX ORDER — AMLODIPINE BESYLATE 10 MG/1
10 TABLET ORAL DAILY
Status: DISCONTINUED | OUTPATIENT
Start: 2019-03-07 | End: 2019-03-07 | Stop reason: HOSPADM

## 2019-03-06 RX ORDER — SODIUM CHLORIDE, SODIUM LACTATE, POTASSIUM CHLORIDE, CALCIUM CHLORIDE 600; 310; 30; 20 MG/100ML; MG/100ML; MG/100ML; MG/100ML
100 INJECTION, SOLUTION INTRAVENOUS CONTINUOUS
Status: DISCONTINUED | OUTPATIENT
Start: 2019-03-06 | End: 2019-03-06 | Stop reason: HOSPADM

## 2019-03-06 RX ORDER — DIPHENHYDRAMINE HYDROCHLORIDE 50 MG/ML
12.5 INJECTION, SOLUTION INTRAMUSCULAR; INTRAVENOUS
Status: DISCONTINUED | OUTPATIENT
Start: 2019-03-06 | End: 2019-03-06 | Stop reason: HOSPADM

## 2019-03-06 RX ORDER — LIDOCAINE HYDROCHLORIDE 20 MG/ML
INJECTION, SOLUTION EPIDURAL; INFILTRATION; INTRACAUDAL; PERINEURAL AS NEEDED
Status: DISCONTINUED | OUTPATIENT
Start: 2019-03-06 | End: 2019-03-06 | Stop reason: HOSPADM

## 2019-03-06 RX ORDER — LISINOPRIL 20 MG/1
40 TABLET ORAL DAILY
Status: DISCONTINUED | OUTPATIENT
Start: 2019-03-07 | End: 2019-03-07 | Stop reason: HOSPADM

## 2019-03-06 RX ORDER — SODIUM CHLORIDE 0.9 % (FLUSH) 0.9 %
5-40 SYRINGE (ML) INJECTION EVERY 8 HOURS
Status: DISCONTINUED | OUTPATIENT
Start: 2019-03-06 | End: 2019-03-07 | Stop reason: HOSPADM

## 2019-03-06 RX ORDER — HEPARIN SODIUM 200 [USP'U]/100ML
INJECTION, SOLUTION INTRAVENOUS AS NEEDED
Status: DISCONTINUED | OUTPATIENT
Start: 2019-03-06 | End: 2019-03-06 | Stop reason: HOSPADM

## 2019-03-06 RX ORDER — ONDANSETRON 2 MG/ML
4 INJECTION INTRAMUSCULAR; INTRAVENOUS
Status: DISCONTINUED | OUTPATIENT
Start: 2019-03-06 | End: 2019-03-07 | Stop reason: HOSPADM

## 2019-03-06 RX ORDER — SODIUM CHLORIDE, SODIUM LACTATE, POTASSIUM CHLORIDE, CALCIUM CHLORIDE 600; 310; 30; 20 MG/100ML; MG/100ML; MG/100ML; MG/100ML
100 INJECTION, SOLUTION INTRAVENOUS CONTINUOUS
Status: DISPENSED | OUTPATIENT
Start: 2019-03-06 | End: 2019-03-07

## 2019-03-06 RX ORDER — SODIUM CHLORIDE 9 MG/ML
75 INJECTION, SOLUTION INTRAVENOUS CONTINUOUS
Status: DISPENSED | OUTPATIENT
Start: 2019-03-06 | End: 2019-03-07

## 2019-03-06 RX ORDER — SODIUM CHLORIDE 0.9 % (FLUSH) 0.9 %
5-40 SYRINGE (ML) INJECTION AS NEEDED
Status: DISCONTINUED | OUTPATIENT
Start: 2019-03-06 | End: 2019-03-07 | Stop reason: HOSPADM

## 2019-03-06 RX ORDER — ALBUTEROL SULFATE 0.83 MG/ML
2.5 SOLUTION RESPIRATORY (INHALATION) AS NEEDED
Status: DISCONTINUED | OUTPATIENT
Start: 2019-03-06 | End: 2019-03-06 | Stop reason: HOSPADM

## 2019-03-06 RX ORDER — FENTANYL CITRATE 50 UG/ML
100 INJECTION, SOLUTION INTRAMUSCULAR; INTRAVENOUS ONCE
Status: DISPENSED | OUTPATIENT
Start: 2019-03-06 | End: 2019-03-06

## 2019-03-06 RX ORDER — ATORVASTATIN CALCIUM 40 MG/1
40 TABLET, FILM COATED ORAL DAILY
Status: DISCONTINUED | OUTPATIENT
Start: 2019-03-07 | End: 2019-03-07 | Stop reason: HOSPADM

## 2019-03-06 RX ORDER — NALOXONE HYDROCHLORIDE 0.4 MG/ML
0.1 INJECTION, SOLUTION INTRAMUSCULAR; INTRAVENOUS; SUBCUTANEOUS AS NEEDED
Status: DISCONTINUED | OUTPATIENT
Start: 2019-03-06 | End: 2019-03-06 | Stop reason: HOSPADM

## 2019-03-06 RX ORDER — FENTANYL CITRATE 50 UG/ML
INJECTION, SOLUTION INTRAMUSCULAR; INTRAVENOUS AS NEEDED
Status: DISCONTINUED | OUTPATIENT
Start: 2019-03-06 | End: 2019-03-06 | Stop reason: HOSPADM

## 2019-03-06 RX ORDER — MORPHINE SULFATE 2 MG/ML
1 INJECTION, SOLUTION INTRAMUSCULAR; INTRAVENOUS
Status: DISCONTINUED | OUTPATIENT
Start: 2019-03-06 | End: 2019-03-07 | Stop reason: HOSPADM

## 2019-03-06 RX ORDER — GLYCOPYRROLATE 0.2 MG/ML
INJECTION INTRAMUSCULAR; INTRAVENOUS AS NEEDED
Status: DISCONTINUED | OUTPATIENT
Start: 2019-03-06 | End: 2019-03-06 | Stop reason: HOSPADM

## 2019-03-06 RX ORDER — CARVEDILOL 12.5 MG/1
12.5 TABLET ORAL 2 TIMES DAILY WITH MEALS
Status: DISCONTINUED | OUTPATIENT
Start: 2019-03-07 | End: 2019-03-07 | Stop reason: HOSPADM

## 2019-03-06 RX ORDER — NITROGLYCERIN 0.4 MG/1
0.4 TABLET SUBLINGUAL
Status: DISCONTINUED | OUTPATIENT
Start: 2019-03-06 | End: 2019-03-07 | Stop reason: HOSPADM

## 2019-03-06 RX ORDER — CLOPIDOGREL BISULFATE 75 MG/1
75 TABLET ORAL DAILY
Status: DISCONTINUED | OUTPATIENT
Start: 2019-03-07 | End: 2019-03-07 | Stop reason: HOSPADM

## 2019-03-06 RX ORDER — HYDROMORPHONE HYDROCHLORIDE 2 MG/ML
0.5 INJECTION, SOLUTION INTRAMUSCULAR; INTRAVENOUS; SUBCUTANEOUS
Status: DISCONTINUED | OUTPATIENT
Start: 2019-03-06 | End: 2019-03-06 | Stop reason: HOSPADM

## 2019-03-06 RX ORDER — ONDANSETRON 2 MG/ML
INJECTION INTRAMUSCULAR; INTRAVENOUS AS NEEDED
Status: DISCONTINUED | OUTPATIENT
Start: 2019-03-06 | End: 2019-03-06 | Stop reason: HOSPADM

## 2019-03-06 RX ORDER — MIDAZOLAM HYDROCHLORIDE 1 MG/ML
2 INJECTION, SOLUTION INTRAMUSCULAR; INTRAVENOUS ONCE
Status: COMPLETED | OUTPATIENT
Start: 2019-03-06 | End: 2019-03-06

## 2019-03-06 RX ORDER — EPHEDRINE SULFATE 50 MG/ML
INJECTION, SOLUTION INTRAVENOUS AS NEEDED
Status: DISCONTINUED | OUTPATIENT
Start: 2019-03-06 | End: 2019-03-06 | Stop reason: HOSPADM

## 2019-03-06 RX ORDER — DIPHENHYDRAMINE HYDROCHLORIDE 50 MG/ML
12.5 INJECTION, SOLUTION INTRAMUSCULAR; INTRAVENOUS
Status: DISCONTINUED | OUTPATIENT
Start: 2019-03-06 | End: 2019-03-07 | Stop reason: HOSPADM

## 2019-03-06 RX ORDER — HYDROCHLOROTHIAZIDE 25 MG/1
25 TABLET ORAL DAILY
Status: DISCONTINUED | OUTPATIENT
Start: 2019-03-07 | End: 2019-03-07 | Stop reason: HOSPADM

## 2019-03-06 RX ORDER — OXYCODONE AND ACETAMINOPHEN 5; 325 MG/1; MG/1
1 TABLET ORAL
Status: DISCONTINUED | OUTPATIENT
Start: 2019-03-06 | End: 2019-03-07 | Stop reason: HOSPADM

## 2019-03-06 RX ORDER — ONDANSETRON 2 MG/ML
4 INJECTION INTRAMUSCULAR; INTRAVENOUS ONCE
Status: DISCONTINUED | OUTPATIENT
Start: 2019-03-06 | End: 2019-03-06 | Stop reason: HOSPADM

## 2019-03-06 RX ORDER — NEOSTIGMINE METHYLSULFATE 1 MG/ML
INJECTION INTRAVENOUS AS NEEDED
Status: DISCONTINUED | OUTPATIENT
Start: 2019-03-06 | End: 2019-03-06 | Stop reason: HOSPADM

## 2019-03-06 RX ORDER — HEPARIN SODIUM 1000 [USP'U]/ML
INJECTION, SOLUTION INTRAVENOUS; SUBCUTANEOUS AS NEEDED
Status: DISCONTINUED | OUTPATIENT
Start: 2019-03-06 | End: 2019-03-06 | Stop reason: HOSPADM

## 2019-03-06 RX ORDER — BUDESONIDE 0.5 MG/2ML
500 INHALANT ORAL
Status: DISCONTINUED | OUTPATIENT
Start: 2019-03-06 | End: 2019-03-07 | Stop reason: HOSPADM

## 2019-03-06 RX ORDER — OXYCODONE HYDROCHLORIDE 5 MG/1
10 TABLET ORAL
Status: DISCONTINUED | OUTPATIENT
Start: 2019-03-06 | End: 2019-03-06 | Stop reason: HOSPADM

## 2019-03-06 RX ADMIN — HEPARIN SODIUM 5000 UNITS: 1000 INJECTION, SOLUTION INTRAVENOUS; SUBCUTANEOUS at 15:31

## 2019-03-06 RX ADMIN — PROPOFOL 100 MG: 10 INJECTION, EMULSION INTRAVENOUS at 16:20

## 2019-03-06 RX ADMIN — LIDOCAINE HYDROCHLORIDE 70 MG: 20 INJECTION, SOLUTION EPIDURAL; INFILTRATION; INTRACAUDAL; PERINEURAL at 15:00

## 2019-03-06 RX ADMIN — FENTANYL CITRATE 25 MCG: 50 INJECTION, SOLUTION INTRAMUSCULAR; INTRAVENOUS at 16:47

## 2019-03-06 RX ADMIN — EPHEDRINE SULFATE 10 MG: 50 INJECTION, SOLUTION INTRAVENOUS at 16:59

## 2019-03-06 RX ADMIN — BUDESONIDE 500 MCG: 0.5 INHALANT RESPIRATORY (INHALATION) at 21:01

## 2019-03-06 RX ADMIN — FENTANYL CITRATE 25 MCG: 50 INJECTION, SOLUTION INTRAMUSCULAR; INTRAVENOUS at 16:50

## 2019-03-06 RX ADMIN — Medication 10 ML: at 22:00

## 2019-03-06 RX ADMIN — EPHEDRINE SULFATE 5 MG: 50 INJECTION, SOLUTION INTRAVENOUS at 16:42

## 2019-03-06 RX ADMIN — FENTANYL CITRATE 50 MCG: 50 INJECTION, SOLUTION INTRAMUSCULAR; INTRAVENOUS at 15:43

## 2019-03-06 RX ADMIN — PROPOFOL 180 MG: 10 INJECTION, EMULSION INTRAVENOUS at 15:00

## 2019-03-06 RX ADMIN — EPHEDRINE SULFATE 5 MG: 50 INJECTION, SOLUTION INTRAVENOUS at 16:00

## 2019-03-06 RX ADMIN — PROPOFOL 50 MG: 10 INJECTION, EMULSION INTRAVENOUS at 17:15

## 2019-03-06 RX ADMIN — NEOSTIGMINE METHYLSULFATE 3 MG: 1 INJECTION INTRAVENOUS at 16:17

## 2019-03-06 RX ADMIN — EPHEDRINE SULFATE 5 MG: 50 INJECTION, SOLUTION INTRAVENOUS at 16:33

## 2019-03-06 RX ADMIN — OXYCODONE AND ACETAMINOPHEN 1 TABLET: 5; 325 TABLET ORAL at 23:12

## 2019-03-06 RX ADMIN — EPHEDRINE SULFATE 5 MG: 50 INJECTION, SOLUTION INTRAVENOUS at 15:51

## 2019-03-06 RX ADMIN — ROCURONIUM BROMIDE 30 MG: 10 INJECTION, SOLUTION INTRAVENOUS at 15:02

## 2019-03-06 RX ADMIN — GLYCOPYRROLATE 0.4 MG: 0.2 INJECTION INTRAMUSCULAR; INTRAVENOUS at 16:17

## 2019-03-06 RX ADMIN — MIDAZOLAM HYDROCHLORIDE 2 MG: 2 INJECTION, SOLUTION INTRAMUSCULAR; INTRAVENOUS at 11:39

## 2019-03-06 RX ADMIN — PROTAMINE SULFATE 10 MG: 10 INJECTION, SOLUTION INTRAVENOUS at 16:20

## 2019-03-06 RX ADMIN — SODIUM CHLORIDE, SODIUM LACTATE, POTASSIUM CHLORIDE, AND CALCIUM CHLORIDE: 600; 310; 30; 20 INJECTION, SOLUTION INTRAVENOUS at 17:13

## 2019-03-06 RX ADMIN — PROTAMINE SULFATE 10 MG: 10 INJECTION, SOLUTION INTRAVENOUS at 16:17

## 2019-03-06 RX ADMIN — FENTANYL CITRATE 50 MCG: 50 INJECTION, SOLUTION INTRAMUSCULAR; INTRAVENOUS at 15:00

## 2019-03-06 RX ADMIN — FENTANYL CITRATE 25 MCG: 50 INJECTION, SOLUTION INTRAMUSCULAR; INTRAVENOUS at 17:15

## 2019-03-06 RX ADMIN — SODIUM CHLORIDE, SODIUM LACTATE, POTASSIUM CHLORIDE, AND CALCIUM CHLORIDE 100 ML/HR: 600; 310; 30; 20 INJECTION, SOLUTION INTRAVENOUS at 11:16

## 2019-03-06 RX ADMIN — ONDANSETRON 4 MG: 2 INJECTION INTRAMUSCULAR; INTRAVENOUS at 16:17

## 2019-03-06 RX ADMIN — SODIUM CHLORIDE 75 ML/HR: 900 INJECTION, SOLUTION INTRAVENOUS at 20:00

## 2019-03-06 RX ADMIN — PROMETHAZINE HYDROCHLORIDE 3.25 MG: 25 INJECTION INTRAMUSCULAR; INTRAVENOUS at 18:01

## 2019-03-06 RX ADMIN — Medication 2 G: at 15:08

## 2019-03-06 RX ADMIN — ALBUTEROL SULFATE 2.5 MG: 2.5 SOLUTION RESPIRATORY (INHALATION) at 21:01

## 2019-03-06 RX ADMIN — PROTAMINE SULFATE 10 MG: 10 INJECTION, SOLUTION INTRAVENOUS at 16:47

## 2019-03-06 RX ADMIN — PROPOFOL 20 MG: 10 INJECTION, EMULSION INTRAVENOUS at 16:17

## 2019-03-06 NOTE — ANESTHESIA PREPROCEDURE EVALUATION
Anesthetic History               Review of Systems / Medical History  Patient summary reviewed, nursing notes reviewed and pertinent labs reviewed    Pulmonary    COPD: moderate               Neuro/Psych              Cardiovascular    Hypertension: well controlled          CAD and cardiac stents    Exercise tolerance: >4 METS     GI/Hepatic/Renal                Endo/Other    Diabetes: well controlled, type 2, using insulin         Other Findings              Physical Exam    Airway  Mallampati: II  TM Distance: 4 - 6 cm  Neck ROM: normal range of motion   Mouth opening: Normal     Cardiovascular  Regular rate and rhythm,  S1 and S2 normal,  no murmur, click, rub, or gallop             Dental  No notable dental hx       Pulmonary  Breath sounds clear to auscultation               Abdominal         Other Findings            Anesthetic Plan    ASA: 3  Anesthesia type: total IV anesthesia and general - backup          Induction: Intravenous  Anesthetic plan and risks discussed with: Patient

## 2019-03-06 NOTE — ROUTINE PROCESS
TRANSFER - OUT REPORT:    Verbal report given to [employer] on Liam Forman  being transferred to [phone] for routine post - op       Report consisted of patients Situation, Background, Assessment and   Recommendations(SBAR). Information from the following report(s) Procedure Summary and Cardiac Rhythm NSR was reviewed with the receiving nurse. Lines:   Peripheral IV 03/06/19 Left Wrist (Active)   Site Assessment Clean, dry, & intact 3/6/2019  5:41 PM   Phlebitis Assessment 0 3/6/2019  5:41 PM   Infiltration Assessment 0 3/6/2019  5:41 PM   Dressing Status Clean, dry, & intact; Occlusive 3/6/2019  5:41 PM   Dressing Type Transparent;Tape 3/6/2019  5:41 PM   Hub Color/Line Status Green; Infusing 3/6/2019  5:41 PM   Alcohol Cap Used No 3/6/2019  5:41 PM        Opportunity for questions and clarification was provided. Patient transported with:   O2 @ 2 liters  Registered Nurse    VTE prophylaxis orders have not been written for Randa Suggs. Patient and family given floor number and nurses name. Family updated re: pt status after security code verified.

## 2019-03-06 NOTE — BRIEF OP NOTE
BRIEF OPERATIVE NOTE    Date of Procedure: 3/6/2019   Preoperative Diagnosis: Intermittent claudication (HCC) [I73.9]  Postoperative Diagnosis: Intermittent claudication (HCC) [I73.9]    Procedure(s):  ULTRASOUND GUIDED ACESS x2 AORTOGRAM BILATERAL LOWER EXTREMITY ARTERIOGRAM AORTIC STENT WITH PTA  BILATERAL ILIAC ARTERY STENT STENT WITH PTA  Surgeon(s) and Role:     * Marvin Wolf MD - Primary         Surgical Assistant:     Surgical Staff:  Circ-1: January York RN  Radiology Technician: Chucho Kumar; Kerrie Elizabeth RT, R, CT  Event Time In Time Out   Incision Start 1511    Incision Close       Anesthesia: General   Estimated Blood Loss: 25cc  Specimens: * No specimens in log *   Findings:    Complications: None  Implants:   Implant Name Type Inv.  Item Serial No.  Lot No. LRB No. Used Action   GRAFT STNT EXP [id number] -- Aubrie Jamir - [id number] Stent GRAFT STNT EXP [id number] -- Aubrie Jamir [de-identified] [employer]  N/A 1 Implanted   GRAFT STNT EXP [id number] -- Aubrie Jamir - [de-identified] Stent GRAFT STNT EXP [id number] -- VIABAHN VBX [de-identified] [employer]  Left 1 Implanted   GRAFT STNT EXP [id number] [facility] - [de-identified] Stent GRAFT STNT EXP [id number] -- VIABAHN VBX [de-identified] [employer]  Right 1 Implanted

## 2019-03-06 NOTE — ANESTHESIA POSTPROCEDURE EVALUATION
Procedure(s):  ULTRASOUND GUIDED ACESS x2 AORTOGRAM BILATERAL LOWER EXTREMITY ARTERIOGRAM AORTIC STENT WITH PTA  BILATERAL ILIAC ARTERY STENT STENT WITH PTA.     Anesthesia Post Evaluation      Multimodal analgesia: multimodal analgesia not used between 6 hours prior to anesthesia start to PACU discharge  Patient location during evaluation: PACU  Patient participation: complete - patient participated  Level of consciousness: awake and alert  Pain management: adequate  Airway patency: patent  Anesthetic complications: no  Cardiovascular status: acceptable  Respiratory status: acceptable  Hydration status: acceptable  Post anesthesia nausea and vomiting:  none      Visit Vitals  /60   Pulse 70   Temp 36.5 °C (97.7 °F)   Resp 14   Ht 5' 1\" (1.549 m)   Wt 48.1 kg (106 lb 2 oz)   SpO2 94%   BMI 20.05 kg/m²

## 2019-03-07 VITALS
DIASTOLIC BLOOD PRESSURE: 56 MMHG | WEIGHT: 107.2 LBS | HEIGHT: 61 IN | BODY MASS INDEX: 20.24 KG/M2 | SYSTOLIC BLOOD PRESSURE: 103 MMHG | OXYGEN SATURATION: 94 % | HEART RATE: 73 BPM | TEMPERATURE: 98.6 F | RESPIRATION RATE: 17 BRPM

## 2019-03-07 PROCEDURE — 74011000250 HC RX REV CODE- 250: Performed by: SURGERY

## 2019-03-07 PROCEDURE — 97161 PT EVAL LOW COMPLEX 20 MIN: CPT

## 2019-03-07 PROCEDURE — 97116 GAIT TRAINING THERAPY: CPT

## 2019-03-07 PROCEDURE — 74011250637 HC RX REV CODE- 250/637: Performed by: SURGERY

## 2019-03-07 PROCEDURE — 94760 N-INVAS EAR/PLS OXIMETRY 1: CPT

## 2019-03-07 PROCEDURE — 94640 AIRWAY INHALATION TREATMENT: CPT

## 2019-03-07 RX ORDER — POTASSIUM CHLORIDE 1.5 G/1.77G
40 POWDER, FOR SOLUTION ORAL 2 TIMES DAILY
Status: DISCONTINUED | OUTPATIENT
Start: 2019-03-07 | End: 2019-03-07

## 2019-03-07 RX ORDER — CLOPIDOGREL BISULFATE 75 MG/1
75 TABLET ORAL DAILY
Qty: 90 TAB | Refills: 2 | Status: SHIPPED | OUTPATIENT
Start: 2019-03-08

## 2019-03-07 RX ADMIN — CARVEDILOL 12.5 MG: 12.5 TABLET, FILM COATED ORAL at 08:54

## 2019-03-07 RX ADMIN — CLOPIDOGREL BISULFATE 75 MG: 75 TABLET, FILM COATED ORAL at 08:54

## 2019-03-07 RX ADMIN — ATORVASTATIN CALCIUM 40 MG: 40 TABLET, FILM COATED ORAL at 08:54

## 2019-03-07 RX ADMIN — Medication 10 ML: at 13:13

## 2019-03-07 RX ADMIN — LISINOPRIL 40 MG: 20 TABLET ORAL at 08:55

## 2019-03-07 RX ADMIN — ASPIRIN 81 MG 81 MG: 81 TABLET ORAL at 08:54

## 2019-03-07 RX ADMIN — Medication 10 ML: at 06:00

## 2019-03-07 RX ADMIN — HYDROCHLOROTHIAZIDE 25 MG: 25 TABLET ORAL at 08:55

## 2019-03-07 RX ADMIN — AMLODIPINE BESYLATE 10 MG: 10 TABLET ORAL at 08:54

## 2019-03-07 RX ADMIN — ALBUTEROL SULFATE 2.5 MG: 2.5 SOLUTION RESPIRATORY (INHALATION) at 09:06

## 2019-03-07 RX ADMIN — BUDESONIDE 500 MCG: 0.5 INHALANT RESPIRATORY (INHALATION) at 09:06

## 2019-03-07 NOTE — PROGRESS NOTES
Problem: Mobility Impaired (Adult and Pediatric)  Goal: *Acute Goals and Plan of Care (Insert Text)  Acute PT Goals:  (1.)Ms. Pernell Aviles will transfer with INDEPENDENCE within 7 treatment day(s). (2.)Ms. Suggs will safely ascend and descend 5 steps with SUPERVISION within 7 treatment day(s). (3.)Ms. Suggs will ambulate with SUPERVISION  for 300+ feet within 7 treatment day(s). (4.) Ms. Pernell Aviles will tolerate 25+ minutes of therapeutic activity/exercise while maintaining stable vitals to improve functional strength and activity tolerance within 7 day(s). ________________________________________________________________________________________________      PHYSICAL THERAPY: Initial Assessment and AM 3/7/2019  INPATIENT: PT Visit Days : 1  Payor: Lisandra Campos / Plan[place] [employer] [employer] [place] / Product Type: [place] /       NAME/AGE/GENDER: Marek Hernandez is a 66 y.o. female   PRIMARY DIAGNOSIS: Intermittent claudication ([facility] [address] [I73.9]  Intermittent claudication (HCC) [I73.9] Intermittent claudication (HCC) Intermittent claudication (HCC)  Procedure(s) (LRB):  ULTRASOUND GUIDED ACESS x2 AORTOGRAM BILATERAL LOWER EXTREMITY ARTERIOGRAM AORTIC STENT WITH PTA  BILATERAL ILIAC ARTERY STENT STENT WITH PTA (N/A)  1 Day Post-Op  ICD-10: Treatment Diagnosis:    · Generalized Muscle Weakness (M62.81)  · Difficulty in walking, Not elsewhere classified (R26.2)   Precaution/Allergies:  Patient has no known allergies. ASSESSMENT:     Ms. Pernell Aviles is a 66year old female 1 day s/p B LE arteriogram. Patient seen this AM for initial physical therapy evaluation: presents sitting up in bedside chair, oriented x4, and endorses 0/10 pain. Patient lives with her spouse and family members in a single story residence with 5 steps to enter. At baseline, patient is independent with ADLs, ambulates with community-level distance without utilizing an assistive device, and drives.  Today, B UE strength/ROM WFL, B LE strength with mild functional weakness appreciated, B LE ROM WFL, and sensation is intact to light touch distal B LE. Patient performed transfers with SBA and ambulation x175 for with SBA. Demonstrated a slow, step-through gait pattern with mildly narrowed base of support and good static + fair dynamic standing balance. No c/o dizziness throughout standing mobility. Educated patient on activity pacing and home safety; verbalized understanding. Ms. Kings Arteaga is approaching her baseline functioning; does demonstrate mildly decreased activity and gait tolerance. Primary RN updated. Will follow. This section established at most recent assessment   PROBLEM LIST (Impairments causing functional limitations):  1. Decreased Ambulation Ability/Technique  2. Decreased Balance  3. Decreased Activity Tolerance  4. Increased Fatigue   INTERVENTIONS PLANNED: (Benefits and precautions of physical therapy have been discussed with the patient.)  1. Balance Exercise  2. Bed Mobility  3. Gait Training  4. Group Therapy  5. Range of Motion (ROM)  6. Therapeutic Activites  7. Therapeutic Exercise/Strengthening  8. Transfer Training     TREATMENT PLAN: Frequency/Duration: 3 times a week until stated goals are met. Rehabilitation Potential For Stated Goals: Good     RECOMMENDED REHABILITATION/EQUIPMENT: (at time of discharge pending progress): Due to the probability of continued deficits (see above) this patient will likely need continued skilled physical therapy after discharge.   Equipment:    None at this time              HISTORY:   History of Present Injury/Illness (Reason for Referral):  S/p arteriogram   Past Medical History/Comorbidities:   Ms. Kings Arteaga  has a past medical history of Atherosclerosis of native coronary artery without angina pectoris (5/13/2016), Bilateral carotid artery occlusion (5/13/2016), CAD (coronary artery disease) (03/27/2015), Carotid artery stenosis without cerebral infarction (5/13/2016), Chest pain, unspecified (5/13/2016), Chronic obstructive pulmonary disease ([facility] [address] Coronary atherosclerosis of native coronary vessel (7/25/2016), Depression (emotion), Diabetes ([facility] [address] ARANDA (dyspnea on exertion), Dyspnea (5/13/2016), GERD (gastroesophageal reflux disease), Hypertension, Mixed hyperlipidemia (5/13/2016), Occlusion and stenosis of bilateral carotid arteries (7/25/2016), Peripheral vascular disease ([facility] [address] (5/13/2016), SOB (shortness of breath), and Tobacco dependence. She also has no past medical history of Adverse effect of anesthesia, Difficult intubation, Malignant hyperthermia due to anesthesia, Nausea & vomiting, or Pseudocholinesterase deficiency. Ms. Gracia Pineda  has a past surgical history that includes hx gyn; hx ptca; pr left heart cath,percutaneous (3/26/2015); and hx partial hysterectomy. Social History/Living Environment:   Home Environment: Private residence  # Steps to Enter: 5  One/Two Story Residence: One story  Living Alone: No  Support Systems: Spouse/Significant Other/Partner, Family member(s), Friends \ neighbors  Patient Expects to be Discharged to[de-identified] Private residence  Current DME Used/Available at Home: Shower chair  Prior Level of Function/Work/Activity:  Patient lives with her spouse and family members in a single story residence with 5 steps to enter. At baseline, patient is independent with ADLs, ambulates with community-level distance without utilizing an assistive device, and drives. Number of Personal Factors/Comorbidities that affect the Plan of Care: 1-2: MODERATE COMPLEXITY   EXAMINATION:   Most Recent Physical Functioning:   Gross Assessment:  AROM: Within functional limits  Strength: Generally decreased, functional  Sensation: Intact(Light touch B LE)               Posture:  Posture (WDL): Exceptions to WDL  Posture Assessment:  Forward head  Balance:  Sitting: Intact  Standing: Impaired  Standing - Static: Good  Standing - Dynamic : Fair Bed Mobility:  Scooting: Supervision  Wheelchair Mobility:     Transfers:  Sit to Stand: Stand-by assistance  Stand to Sit: Stand-by assistance  Gait:     Base of Support: Narrowed; Center of gravity altered  Speed/Chayito: Slow  Distance (ft): 175 Feet (ft)  Assistive Device: (none)  Ambulation - Level of Assistance: Stand-by assistance  Interventions: Safety awareness training; Tactile cues; Verbal cues      Body Structures Involved:  1. Vascular Body Functions Affected:  1. Neuromusculoskeletal  2. Movement Related Activities and Participation Affected:  1. Mobility   Number of elements that affect the Plan of Care: 4+: HIGH COMPLEXITY   CLINICAL PRESENTATION:   Presentation: Stable and uncomplicated: LOW COMPLEXITY   CLINICAL DECISION MAKING:   [facility] AM-PAC 6 Clicks   Basic Mobility Inpatient Short Form  How much difficulty does the patient currently have. .. Unable A Lot A Little None   1. Turning over in bed (including adjusting bedclothes, sheets and blankets)? [] 1   [] 2   [] 3   [x] 4   2. Sitting down on and standing up from a chair with arms ( e.g., wheelchair, bedside commode, etc.)   [] 1   [] 2   [] 3   [x] 4   3. Moving from lying on back to sitting on the side of the bed? [] 1   [] 2   [] 3   [x] 4   How much help from another person does the patient currently need. .. Total A Lot A Little None   4. Moving to and from a bed to a chair (including a wheelchair)? [] 1   [] 2   [] 3   [x] 4   5. Need to walk in hospital room? [] 1   [] 2   [x] 3   [] 4   6. Climbing 3-5 steps with a railing? [] 1   [] 2   [x] 3   [] 4   © 2007, Trustees of [facility] [employer], under license to [employer]. All rights reserved      Score:  Initial: 22 Most Recent: 22 (Date: 3/7/19 )    Interpretation of Tool:  Represents activities that are increasingly more difficult (i.e. Bed mobility, Transfers, Gait). Medical Necessity:     · Patient demonstrates good rehab potential due to higher previous functional level.   Reason for Services/Other Comments:  · Patient continues to require skilled intervention due to decreased functional mobility and balance/gait status from baseline. .   Use of outcome tool(s) and clinical judgement create a POC that gives a: Clear prediction of patient's progress: LOW COMPLEXITY            TREATMENT:   (In addition to Assessment/Re-Assessment sessions the following treatments were rendered)   Pre-treatment Symptoms/Complaints:    Pain: Initial:   Pain Intensity 1: 0  Post Session:  0/10     Initial Evaluation (12 Minutes)  Gait Training (  8 minutes):  Gait training to improve and/or restore physical functioning as related to mobility, strength and balance. Ambulated 175 Feet (ft) with Stand-by assistance using a (none) and minimal Safety awareness training; Tactile cues; Verbal cues related to their activity pacing and gait mechanics. Braces/Orthotics/Lines/Etc:   · O2 Device: Room air  Treatment/Session Assessment:    · Response to Treatment:  See above. · Interdisciplinary Collaboration:   o Physical Therapist  o Registered Nurse  · After treatment position/precautions:   o Up in chair  o Bed/Chair-wheels locked  o Bed in low position  o Call light within reach  o RN notified  o Family at bedside   · Compliance with Program/Exercises: Will assess as treatment progresses  · Recommendations/Intent for next treatment session: \"Next visit will focus on advancements to more challenging activities and reduction in assistance provided\".   Total Treatment Duration:  PT Patient Time In/Time Out  Time In: 0955  Time Out: 3700 Dania Hernandez DPT

## 2019-03-07 NOTE — PROGRESS NOTES
TRANSFER - IN REPORT:    Verbal report received from nica(name) on Randa Suggs  being received from pacu(unit) for routine progression of care      Report consisted of patients Situation, Background, Assessment and   Recommendations(SBAR). Information from the following report(s) Kardex was reviewed with the receiving nurse. Opportunity for questions and clarification was provided. Assessment completed upon patients arrival to unit and care assumed.

## 2019-03-07 NOTE — PROGRESS NOTES
Pt discharge home this day. No voiced discharge needs. Milestones met.      Care Management Interventions  Transition of Care Consult (CM Consult): Discharge Planning  Discharge Location  Discharge Placement: Home

## 2019-03-07 NOTE — PROCEDURES
[facility]  PROCEDURE NOTE    Name:  Solange Medel  MR#:  [id number]  :  1960  ACCOUNT #:  [de-identified]  DATE OF SERVICE:  2019    PREOPERATIVE DIAGNOSIS:  Bilateral lower extremity ischemia (claudication). POSTOPERATIVE DIAGNOSIS:  Bilateral lower extremity ischemia (claudication). PROCEDURE PERFORMED:  Abdominal aortic and bilateral iliac PTA and stent placement. SURGEON:  Nasreen Haynes MD    ANESTHESIA:  General endotracheal.    ESTIMATED BLOOD LOSS:  25 mL. TOTAL CONTRAST:  160 mL of Isovue 300. TOTAL FLUOROSCOPY TIME:  17 minutes. DESCRIPTION OF PROCEDURE:  The patient was brought to the angio suite and placed on the angio table in supine position. Following adequate general endotracheal anesthesia and time-out procedure, the groins were draped and prepped in sterile fashion. The right common femoral artery was then percutaneously punctured under direct vision using ultrasound. A 0.035 guidewire was advanced into the aorta and a 5-Chilean sheath was placed via Seldinger technique. Next, an abdominal aortogram was performed. The aortogram demonstrated severe aortoiliac occlusive disease. The catheter was then pulled down just above the aortic bifurcation where bilateral lower extremity bolus acacia imaging was performed. Next, the left common femoral artery was then percutaneously punctured under direct vision using ultrasound. A 5-Chilean sheath was placed over guidewire. Next, the KMP catheter was used to deliver the Amplatz wires into the aorta up to the level of the aortic arch. The 5-Chilean sheath on the right was then upsized to an 8-Chilean sheath and the 5-Chilean sheath on the left was upsized to a 7-Chilean sheath. From the right side, an 8 x 61 VBX stent was advanced and then deployed into the distal aorta across the area of high-grade multiple stenoses in a series. This was dilated to 12 mmHg or just slightly greater than 8 mm in diameter. Then two 6 x 59 VBX stents were advanced from either side and placed within the aortic stent with approximately 2 cm of overlap. These were then deployed and post dilated. Completion imaging demonstrated excellent result with no residual stenosis and excellent flow distally. At this point, guidewires and catheters removed. Direct pressure was held and hemostasis was achieved. The patient tolerated the procedure well. No complications. ANGIOGRAPHIC FINDINGS:  The abdominal aorta was severely diseased in its mid to distal portion. The common iliac arteries were severely diseased proximally. Hypogastric arteries were patent bilaterally. The external iliac arteries were normal in appearance. On the right, the common femoral, profunda, SFA, popliteal and tibial vessels were all normal in appearance. Similarly on the left, the common femoral, profunda, SFA, and tibial vessels were normal in appearance. IMPRESSION:  Satisfactory percutaneous transluminal angioplasty and stent of severe aortoiliac occlusive disease.       MD MANASA [email or web address]  D:  03/06/2019 17:20  T:  03/07/2019 1:03  JOB #:  [id number]

## 2019-03-07 NOTE — PROGRESS NOTES
Progress Note    Patient: Marek Hernandez MRN: [id number]  SSN: [id number]    YOB: 1960  Age: 66 y.o. Sex: female      Admission Date: 3/6/2019    LOS: 1 day     1 Day Post-Op    PROCEDURE(S):  ULTRASOUND GUIDED ACESS x2  AORTOGRAM  BILATERAL LOWER EXTREMITY ARTERIOGRAM  AORTIC STENT WITH PTA  BILATERAL ILIAC ARTERY STENT  STENT WITH PTA    Subjective:     Patient has no complaints, no further bleeding overnight. Objective:     Vitals:    03/07/19 0320 03/07/19 0501 03/07/19 0723 03/07/19 0909   BP: 109/55  136/66    Pulse: 68  77    Resp: 16  17    Temp: 98.6 °F (37 °C)  98.4 °F (36.9 °C)    SpO2: 95%  93% 94%   Weight:  107 lb 3.2 oz (48.6 kg)     Height:            Physical Exam:   GENERAL: alert, cooperative, no distress  EYE: EOM intact, no nystagmus  LUNG: clear to auscultation bilaterally, normal respiratory effort  HEART: regular rate and rhythm  ABDOMEN: soft, nontender, nondistended, bowel sounds normoactive; pressure dressing at groin removed, small amount of dried serosanguineous drainage on gauze, puncture sites clean without induration / ecchymosis / erythema / drainage  EXTREMITIES: warm, normal ROM    Lab/Data Review: All lab results for the last 24 hours reviewed. Assessment / Plan / Recommendations / Medical Decision Making:     Principal Problem:    Intermittent claudication ([address] (3/6/2019)        Restart Plavix, ASA  Regular diet  OOB, mobilize - PT consult  D/C home later today if patient remains stable      Signed By: Helen Eng PA-C    March 7, 2019      Physician Assistant with [facility] Vascular Surgery  Liliam García.  Sandhya Hill MD / Olga Fatima MD

## 2019-03-07 NOTE — PROGRESS NOTES
Discharge instructions reviewed with pt and signed, opportunity provided for questions. Plavix rx provided to pt. Pt assessment unchanged. Pt from unit via wheelchair in NAD and accompanied by significant other, denies further needs at this time.

## 2021-07-18 ENCOUNTER — HOSPITAL ENCOUNTER (EMERGENCY)
Age: 61
Discharge: HOME | End: 2021-07-18
Attending: EMERGENCY MEDICINE
Payer: COMMERCIAL

## 2021-07-18 ENCOUNTER — TELEPHONE (OUTPATIENT)
Dept: CARDIOLOGY | Age: 61
End: 2021-07-18

## 2021-07-18 VITALS
RESPIRATION RATE: 16 BRPM | DIASTOLIC BLOOD PRESSURE: 65 MMHG | HEART RATE: 88 BPM | BODY MASS INDEX: 17.94 KG/M2 | TEMPERATURE: 97.9 F | WEIGHT: 95 LBS | SYSTOLIC BLOOD PRESSURE: 121 MMHG | HEIGHT: 61 IN | OXYGEN SATURATION: 94 %

## 2021-07-18 DIAGNOSIS — R07.9 CHEST PAIN, UNSPECIFIED TYPE: Primary | ICD-10-CM

## 2021-07-18 LAB
ALBUMIN SERPL-MCNC: 3.6 G/DL (ref 3.5–5)
ALBUMIN/GLOB SERPL: 1.1 {RATIO} (ref 1.2–3.5)
ALP SERPL-CCNC: 106 U/L (ref 50–136)
ALT SERPL-CCNC: 25 U/L (ref 12–65)
ANION GAP SERPL CALC-SCNC: 11 MMOL/L (ref 7–16)
AST SERPL-CCNC: 14 U/L (ref 15–37)
ATRIAL RATE: 101 BPM
BASOPHILS # BLD: 0 K/UL (ref 0–0.2)
BASOPHILS NFR BLD: 0 % (ref 0–2)
BILIRUB SERPL-MCNC: 0.3 MG/DL (ref 0.2–1.1)
BUN SERPL-MCNC: 9 MG/DL (ref 6–23)
CALCIUM SERPL-MCNC: 9.4 MG/DL (ref 8.3–10.4)
CALCULATED P AXIS, ECG09: 53 DEGREES
CALCULATED R AXIS, ECG10: 69 DEGREES
CALCULATED T AXIS, ECG11: 64 DEGREES
CHLORIDE SERPL-SCNC: 106 MMOL/L (ref 98–107)
CO2 SERPL-SCNC: 26 MMOL/L (ref 21–32)
CREAT SERPL-MCNC: 0.6 MG/DL (ref 0.6–1)
DIAGNOSIS, 93000: NORMAL
DIFFERENTIAL METHOD BLD: ABNORMAL
EOSINOPHIL # BLD: 0.2 K/UL (ref 0–0.8)
EOSINOPHIL NFR BLD: 1 % (ref 0.5–7.8)
ERYTHROCYTE [DISTWIDTH] IN BLOOD BY AUTOMATED COUNT: 12.4 % (ref 11.9–14.6)
GLOBULIN SER CALC-MCNC: 3.3 G/DL (ref 2.3–3.5)
GLUCOSE SERPL-MCNC: 171 MG/DL (ref 65–100)
HCT VFR BLD AUTO: 46.6 % (ref 35.8–46.3)
HGB BLD-MCNC: 16.3 G/DL (ref 11.7–15.4)
IMM GRANULOCYTES # BLD AUTO: 0 K/UL (ref 0–0.5)
IMM GRANULOCYTES NFR BLD AUTO: 0 % (ref 0–5)
LIPASE SERPL-CCNC: 212 U/L (ref 73–393)
LYMPHOCYTES # BLD: 3.4 K/UL (ref 0.5–4.6)
LYMPHOCYTES NFR BLD: 30 % (ref 13–44)
MAGNESIUM SERPL-MCNC: 1.6 MG/DL (ref 1.8–2.4)
MCH RBC QN AUTO: 32.3 PG (ref 26.1–32.9)
MCHC RBC AUTO-ENTMCNC: 35 G/DL (ref 31.4–35)
MCV RBC AUTO: 92.3 FL (ref 79.6–97.8)
MONOCYTES # BLD: 0.7 K/UL (ref 0.1–1.3)
MONOCYTES NFR BLD: 6 % (ref 4–12)
NEUTS SEG # BLD: 6.9 K/UL (ref 1.7–8.2)
NEUTS SEG NFR BLD: 62 % (ref 43–78)
NRBC # BLD: 0 K/UL (ref 0–0.2)
P-R INTERVAL, ECG05: 130 MS
PLATELET # BLD AUTO: 227 K/UL (ref 150–450)
PMV BLD AUTO: 11 FL (ref 9.4–12.3)
POTASSIUM SERPL-SCNC: 3.5 MMOL/L (ref 3.5–5.1)
PROT SERPL-MCNC: 6.9 G/DL (ref 6.3–8.2)
Q-T INTERVAL, ECG07: 358 MS
QRS DURATION, ECG06: 80 MS
QTC CALCULATION (BEZET), ECG08: 464 MS
RBC # BLD AUTO: 5.05 M/UL (ref 4.05–5.2)
SODIUM SERPL-SCNC: 143 MMOL/L (ref 136–145)
TROPONIN-HIGH SENSITIVITY: 6.1 PG/ML (ref 0–14)
TROPONIN-HIGH SENSITIVITY: 6.2 PG/ML (ref 0–14)
VENTRICULAR RATE, ECG03: 101 BPM
WBC # BLD AUTO: 11.2 K/UL (ref 4.3–11.1)

## 2021-07-18 PROCEDURE — 99284 EMERGENCY DEPT VISIT MOD MDM: CPT

## 2021-07-18 PROCEDURE — 83690 ASSAY OF LIPASE: CPT

## 2021-07-18 PROCEDURE — 74011000250 HC RX REV CODE- 250: Performed by: EMERGENCY MEDICINE

## 2021-07-18 PROCEDURE — 74011250636 HC RX REV CODE- 250/636: Performed by: EMERGENCY MEDICINE

## 2021-07-18 PROCEDURE — 83735 ASSAY OF MAGNESIUM: CPT

## 2021-07-18 PROCEDURE — 85025 COMPLETE CBC W/AUTO DIFF WBC: CPT

## 2021-07-18 PROCEDURE — 96374 THER/PROPH/DIAG INJ IV PUSH: CPT

## 2021-07-18 PROCEDURE — 80053 COMPREHEN METABOLIC PANEL: CPT

## 2021-07-18 PROCEDURE — C9113 INJ PANTOPRAZOLE SODIUM, VIA: HCPCS | Performed by: EMERGENCY MEDICINE

## 2021-07-18 PROCEDURE — 93005 ELECTROCARDIOGRAM TRACING: CPT | Performed by: EMERGENCY MEDICINE

## 2021-07-18 PROCEDURE — 74011250637 HC RX REV CODE- 250/637: Performed by: EMERGENCY MEDICINE

## 2021-07-18 PROCEDURE — 84484 ASSAY OF TROPONIN QUANT: CPT

## 2021-07-18 RX ORDER — SODIUM CHLORIDE 0.9 % (FLUSH) 0.9 %
5-10 SYRINGE (ML) INJECTION EVERY 8 HOURS
Status: DISCONTINUED | OUTPATIENT
Start: 2021-07-18 | End: 2021-07-18 | Stop reason: HOSPADM

## 2021-07-18 RX ORDER — SUCRALFATE 1 G/1
1 TABLET ORAL
Status: COMPLETED | OUTPATIENT
Start: 2021-07-18 | End: 2021-07-18

## 2021-07-18 RX ORDER — SUCRALFATE 1 G/1
1 TABLET ORAL 4 TIMES DAILY
Qty: 30 TABLET | Refills: 1 | Status: SHIPPED | OUTPATIENT
Start: 2021-07-18

## 2021-07-18 RX ORDER — SODIUM CHLORIDE 0.9 % (FLUSH) 0.9 %
5-10 SYRINGE (ML) INJECTION AS NEEDED
Status: DISCONTINUED | OUTPATIENT
Start: 2021-07-18 | End: 2021-07-18 | Stop reason: HOSPADM

## 2021-07-18 RX ADMIN — SUCRALFATE 1 G: 1 TABLET ORAL at 18:07

## 2021-07-18 RX ADMIN — SODIUM CHLORIDE 40 MG: 9 INJECTION, SOLUTION INTRAMUSCULAR; INTRAVENOUS; SUBCUTANEOUS at 18:07

## 2021-07-18 NOTE — ED TRIAGE NOTES
Pt ambulatory to triage. Pt states she was taking her meds this morning and while taking a drink of water she began to feel pain in her mid chest. Denies hx of food bolus or esophageal atresia. Pt states the pain went away after about a few seconds. Pt states no cp since that time.

## 2021-07-18 NOTE — DISCHARGE INSTRUCTIONS
Carafate 4 times daily  Begin over-the-counter Prilosec or Nexium twice daily  Contact your primary care provider for ongoing care  At any point with exertional chest pain or pain consistent with your typical angina please return

## 2021-07-18 NOTE — ED PROVIDER NOTES
Patient had onset of some chest pain she had put her morning medicines in her mouth went to take some water and all of a sudden had what she describes as fairly sharp pain. This lasted only just a few seconds and then it resolved. Not really returned since that time. Has had coronary artery disease in the past but his not had brief pains as like this 1 to be typical pains of it. The history is provided by the patient and the spouse. Chest Pain (Angina)   This is a new problem. The problem has been resolved. The pain is associated with normal activity. The pain is mild. The quality of the pain is described as sharp. The pain does not radiate. Pertinent negatives include no cough, no diaphoresis and no shortness of breath. Risk factors include cardiac disease. Her past medical history does not include DVT or PE. Procedural history includes cardiac catheterization and cardiac stents.        Past Medical History:   Diagnosis Date    Atherosclerosis of native coronary artery without angina pectoris 5/13/2016    Bilateral carotid artery occlusion 5/13/2016    CAD (coronary artery disease) 03/27/2015    stents x 2    Carotid artery stenosis without cerebral infarction 5/13/2016    Chest pain, unspecified 5/13/2016    Chronic obstructive pulmonary disease (HCC)     Coronary atherosclerosis of native coronary vessel 7/25/2016    Depression (emotion)     Diabetes (HCC)     avg- 150- 200- no hypo    ARANDA (dyspnea on exertion)     Dyspnea 5/13/2016    GERD (gastroesophageal reflux disease)     Hypertension     Mixed hyperlipidemia 5/13/2016    Occlusion and stenosis of bilateral carotid arteries 7/25/2016    Peripheral vascular disease ([address] 5/13/2016    SOB (shortness of breath)     Tobacco dependence        Past Surgical History:   Procedure Laterality Date    HX GYN      HX PARTIAL HYSTERECTOMY      HX PTCA      with cardiac stenting    IN LEFT HEART CATH,PERCUTANEOUS  3/26/2015    Xience stent to mLAD, xience stent to [facility] ballooned OM1    VASCULAR SURGERY PROCEDURE UNLIST  03/06/2019    abd aortic and bilateral iliac PTA and stent placement         Family History:   Problem Relation Age of Onset    Hypertension Other         Family history    Stroke Mother     Hypertension Mother     Diabetes Father        Social History     Socioeconomic History    Marital status:      Spouse name: Not on file    Number of children: Not on file    Years of education: Not on file    Highest education level: Not on file   Occupational History    Not on file   Tobacco Use    Smoking status: Current Every Day Smoker     Packs/day: 1.50    Smokeless tobacco: Never Used    Tobacco comment: has smoked for 30 to 40 years, currently smokes 1 PPD   Substance and Sexual Activity    Alcohol use: No     Alcohol/week: 0.0 standard drinks    Drug use: No    Sexual activity: Yes     Partners: Male   Other Topics Concern    Not on file   Social History Narrative    Not on file     Social Determinants of Health     Financial Resource Strain:     Difficulty of Paying Living Expenses:    Food Insecurity:     Worried About Running Out of Food in the Last Year:     Ran Out of Food in the Last Year:    Transportation Needs:     Lack of Transportation (Medical):  Lack of Transportation (Non-Medical):    Physical Activity:     Days of Exercise per Week:     Minutes of Exercise per Session:    Stress:     Feeling of Stress :    Social Connections:     Frequency of Communication with Friends and Family:     Frequency of Social Gatherings with Friends and Family:     Attends Tenriism Services:     Active Member of Clubs or Organizations:     Attends Club or Organization Meetings:     Marital Status:    Intimate Partner Violence:     Fear of Current or Ex-Partner:     Emotionally Abused:     Physically Abused:     Sexually Abused: ALLERGIES: Patient has no known allergies.     Review of Systems Constitutional: Negative for diaphoresis. Respiratory: Negative for cough, shortness of breath and wheezing. Cardiovascular: Positive for chest pain. Gastrointestinal: Negative. Genitourinary: Negative. Psychiatric/Behavioral: Negative. All other systems reviewed and are negative. Vitals:    07/18/21 1556 07/18/21 1657   BP: (!) 189/85 (!) 172/80   Pulse: (!) 102 94   Resp: 16 16   Temp: 97.9 °F (36.6 °C)    SpO2: 96% 97%   Weight: 43.1 kg (95 lb)    Height: 5' 1\" (1.549 m)             Physical Exam  Vitals and nursing note reviewed. Constitutional:       General: She is not in acute distress. Appearance: She is well-developed. Comments: thin   HENT:      Head: Atraumatic. Eyes:      General: No scleral icterus. Cardiovascular:      Rate and Rhythm: Normal rate. Heart sounds: Normal heart sounds. Pulmonary:      Effort: Pulmonary effort is normal. No respiratory distress. Breath sounds: Normal breath sounds. No decreased breath sounds, wheezing or rhonchi. Musculoskeletal:      Cervical back: Neck supple. Right lower leg: No tenderness. No edema. Left lower leg: No tenderness. No edema. Comments: No extremity abnl   Skin:     General: Skin is warm and dry. Neurological:      General: No focal deficit present. Psychiatric:         Mood and Affect: Mood normal. Mood is not anxious. Behavior: Behavior normal.         Thought Content: Thought content normal.          MDM  Number of Diagnoses or Management Options  Chest pain, unspecified type  Diagnosis management comments: Several brief chest pains each just a few seconds. Studies are negative and no return of sx.         Amount and/or Complexity of Data Reviewed  Clinical lab tests: ordered and reviewed  Tests in the medicine section of CPT®: (===============================================  ED EKG Interpretation  EKG was interpreted in the absence of a cardiologist.    EKG rhythm: normal sinus rhythm  Rate: 101  ST Segments:no acute injury pattern  Alfonzo Coffman MD; 7/20/2021 @10:59 PM================    )    Risk of Complications, Morbidity, and/or Mortality  Presenting problems: moderate  Diagnostic procedures: moderate  Management options: moderate    Patient Progress  Patient progress: stable         Procedures

## 2021-07-18 NOTE — ED NOTES
I have reviewed discharge instructions with the patient and spouse. The patient and spouse verbalized understanding. Patient left ED via Discharge Method: ambulatory to Home with (insert name of family/friend, self, transport ). Opportunity for questions and clarification provided. Patient given 1 scripts. To continue your aftercare when you leave the hospital, you may receive an automated call from our care team to check in on how you are doing. This is a free service and part of our promise to provide the best care and service to meet your aftercare needs.  If you have questions, or wish to unsubscribe from this service please call [phone]. Thank you for Choosing our [facility] Emergency Department.

## 2021-07-18 NOTE — TELEPHONE ENCOUNTER
Patient called with sudden onset chest pain while standing in kitchen several hours ago that lasted few seconds. Now sx gone but feels tired \"not really herself\". Instructed her to go to local ED. She refuses to go to ED at [facility]. Instructed her to come to [facility] ED. She's unsure if she needs to come now. Instructed her to make appt with Dr. Rupa Hendrickson in [facility] ASAP. She verbalizes understanding.

## 2022-03-18 PROBLEM — I73.9 INTERMITTENT CLAUDICATION (HCC): Status: ACTIVE | Noted: 2019-03-06

## 2023-01-01 ENCOUNTER — HOSPITAL ENCOUNTER (EMERGENCY)
Age: 63
Discharge: HOME | End: 2023-01-01
Attending: EMERGENCY MEDICINE
Payer: COMMERCIAL

## 2023-01-01 ENCOUNTER — APPOINTMENT (OUTPATIENT)
Dept: CT IMAGING | Age: 63
End: 2023-01-01
Payer: COMMERCIAL

## 2023-01-01 VITALS
DIASTOLIC BLOOD PRESSURE: 65 MMHG | WEIGHT: 95 LBS | HEART RATE: 95 BPM | OXYGEN SATURATION: 99 % | RESPIRATION RATE: 20 BRPM | HEIGHT: 61 IN | BODY MASS INDEX: 17.94 KG/M2 | TEMPERATURE: 99 F | SYSTOLIC BLOOD PRESSURE: 130 MMHG

## 2023-01-01 DIAGNOSIS — U07.1 COVID: Primary | ICD-10-CM

## 2023-01-01 LAB
ALBUMIN SERPL-MCNC: 3.6 G/DL (ref 3.2–4.6)
ALBUMIN/GLOB SERPL: 1.1 {RATIO} (ref 0.4–1.6)
ALP SERPL-CCNC: 91 U/L (ref 50–136)
ALT SERPL-CCNC: 23 U/L (ref 12–65)
ANION GAP SERPL CALC-SCNC: 7 MMOL/L (ref 2–11)
AST SERPL-CCNC: 25 U/L (ref 15–37)
BASOPHILS # BLD: 0 K/UL (ref 0–0.2)
BASOPHILS NFR BLD: 0 % (ref 0–2)
BILIRUB SERPL-MCNC: 0.6 MG/DL (ref 0.2–1.1)
BUN SERPL-MCNC: 11 MG/DL (ref 8–23)
CALCIUM SERPL-MCNC: 8.8 MG/DL (ref 8.3–10.4)
CHLORIDE SERPL-SCNC: 104 MMOL/L (ref 101–110)
CO2 SERPL-SCNC: 25 MMOL/L (ref 21–32)
CREAT SERPL-MCNC: 0.5 MG/DL (ref 0.6–1)
DIFFERENTIAL METHOD BLD: ABNORMAL
EKG ATRIAL RATE: 104 BPM
EKG DIAGNOSIS: NORMAL
EKG P AXIS: 75 DEGREES
EKG P-R INTERVAL: 132 MS
EKG Q-T INTERVAL: 346 MS
EKG QRS DURATION: 78 MS
EKG QTC CALCULATION (BAZETT): 454 MS
EKG R AXIS: 88 DEGREES
EKG T AXIS: 65 DEGREES
EKG VENTRICULAR RATE: 104 BPM
EOSINOPHIL # BLD: 0 K/UL (ref 0–0.8)
EOSINOPHIL NFR BLD: 1 % (ref 0.5–7.8)
ERYTHROCYTE [DISTWIDTH] IN BLOOD BY AUTOMATED COUNT: 12.7 % (ref 11.9–14.6)
FLUAV AG NPH QL IA: NEGATIVE
FLUBV AG NPH QL IA: NEGATIVE
GLOBULIN SER CALC-MCNC: 3.3 G/DL (ref 2.8–4.5)
GLUCOSE SERPL-MCNC: 163 MG/DL (ref 65–100)
HCT VFR BLD AUTO: 41.6 % (ref 35.8–46.3)
HGB BLD-MCNC: 14.4 G/DL (ref 11.7–15.4)
IMM GRANULOCYTES # BLD AUTO: 0 K/UL (ref 0–0.5)
IMM GRANULOCYTES NFR BLD AUTO: 0 % (ref 0–5)
LACTATE SERPL-SCNC: 1 MMOL/L (ref 0.4–2)
LIPASE SERPL-CCNC: 101 U/L (ref 73–393)
LYMPHOCYTES # BLD: 0.4 K/UL (ref 0.5–4.6)
LYMPHOCYTES NFR BLD: 5 % (ref 13–44)
MCH RBC QN AUTO: 32.4 PG (ref 26.1–32.9)
MCHC RBC AUTO-ENTMCNC: 34.6 G/DL (ref 31.4–35)
MCV RBC AUTO: 93.7 FL (ref 82–102)
MONOCYTES # BLD: 0.7 K/UL (ref 0.1–1.3)
MONOCYTES NFR BLD: 9 % (ref 4–12)
NEUTS SEG # BLD: 6.7 K/UL (ref 1.7–8.2)
NEUTS SEG NFR BLD: 85 % (ref 43–78)
NRBC # BLD: 0 K/UL (ref 0–0.2)
PLATELET # BLD AUTO: 150 K/UL (ref 150–450)
PMV BLD AUTO: 11.3 FL (ref 9.4–12.3)
POTASSIUM SERPL-SCNC: 3.7 MMOL/L (ref 3.5–5.1)
PROT SERPL-MCNC: 6.9 G/DL (ref 6.3–8.2)
RBC # BLD AUTO: 4.44 M/UL (ref 4.05–5.2)
SARS-COV-2 RDRP RESP QL NAA+PROBE: DETECTED
SODIUM SERPL-SCNC: 136 MMOL/L (ref 133–143)
SOURCE: ABNORMAL
SPECIMEN SOURCE: NORMAL
TROPONIN I SERPL HS-MCNC: 6.1 PG/ML (ref 0–14)
WBC # BLD AUTO: 8 K/UL (ref 4.3–11.1)

## 2023-01-01 PROCEDURE — 83605 ASSAY OF LACTIC ACID: CPT

## 2023-01-01 PROCEDURE — 80053 COMPREHEN METABOLIC PANEL: CPT

## 2023-01-01 PROCEDURE — 99285 EMERGENCY DEPT VISIT HI MDM: CPT

## 2023-01-01 PROCEDURE — 74177 CT ABD & PELVIS W/CONTRAST: CPT

## 2023-01-01 PROCEDURE — 2580000003 HC RX 258: Performed by: EMERGENCY MEDICINE

## 2023-01-01 PROCEDURE — 87804 INFLUENZA ASSAY W/OPTIC: CPT

## 2023-01-01 PROCEDURE — 96374 THER/PROPH/DIAG INJ IV PUSH: CPT

## 2023-01-01 PROCEDURE — 84484 ASSAY OF TROPONIN QUANT: CPT

## 2023-01-01 PROCEDURE — 83690 ASSAY OF LIPASE: CPT

## 2023-01-01 PROCEDURE — 6360000004 HC RX CONTRAST MEDICATION: Performed by: EMERGENCY MEDICINE

## 2023-01-01 PROCEDURE — 96375 TX/PRO/DX INJ NEW DRUG ADDON: CPT

## 2023-01-01 PROCEDURE — 85025 COMPLETE CBC W/AUTO DIFF WBC: CPT

## 2023-01-01 PROCEDURE — 96361 HYDRATE IV INFUSION ADD-ON: CPT

## 2023-01-01 PROCEDURE — 87635 SARS-COV-2 COVID-19 AMP PRB: CPT

## 2023-01-01 PROCEDURE — 93005 ELECTROCARDIOGRAM TRACING: CPT | Performed by: EMERGENCY MEDICINE

## 2023-01-01 PROCEDURE — 6360000002 HC RX W HCPCS: Performed by: EMERGENCY MEDICINE

## 2023-01-01 RX ORDER — ONDANSETRON 4 MG/1
4 TABLET, ORALLY DISINTEGRATING ORAL 3 TIMES DAILY PRN
Qty: 21 TABLET | Refills: 0 | Status: SHIPPED | OUTPATIENT
Start: 2023-01-01

## 2023-01-01 RX ORDER — 0.9 % SODIUM CHLORIDE 0.9 %
1000 INTRAVENOUS SOLUTION INTRAVENOUS ONCE
Status: COMPLETED | OUTPATIENT
Start: 2023-01-01 | End: 2023-01-01

## 2023-01-01 RX ORDER — ONDANSETRON 2 MG/ML
4 INJECTION INTRAMUSCULAR; INTRAVENOUS ONCE
Status: COMPLETED | OUTPATIENT
Start: 2023-01-01 | End: 2023-01-01

## 2023-01-01 RX ORDER — KETOROLAC TROMETHAMINE 15 MG/ML
15 INJECTION, SOLUTION INTRAMUSCULAR; INTRAVENOUS ONCE
Status: COMPLETED | OUTPATIENT
Start: 2023-01-01 | End: 2023-01-01

## 2023-01-01 RX ADMIN — IOPAMIDOL 100 ML: 755 INJECTION, SOLUTION INTRAVENOUS at 20:38

## 2023-01-01 RX ADMIN — KETOROLAC TROMETHAMINE 15 MG: 15 INJECTION, SOLUTION INTRAMUSCULAR; INTRAVENOUS at 20:29

## 2023-01-01 RX ADMIN — SODIUM CHLORIDE 1000 ML: 9 INJECTION, SOLUTION INTRAVENOUS at 20:29

## 2023-01-01 RX ADMIN — ONDANSETRON 4 MG: 2 INJECTION INTRAMUSCULAR; INTRAVENOUS at 20:29

## 2023-01-01 ASSESSMENT — ENCOUNTER SYMPTOMS
RESPIRATORY NEGATIVE: 1
ABDOMINAL PAIN: 1
EYES NEGATIVE: 1

## 2023-01-01 ASSESSMENT — PAIN - FUNCTIONAL ASSESSMENT: PAIN_FUNCTIONAL_ASSESSMENT: NONE - DENIES PAIN

## 2023-01-01 NOTE — Clinical Note
Zachary Najjar was seen and treated in our emergency department on 1/1/2023. She may return to work on 01/11/2023. If you have any questions or concerns, please don't hesitate to call.       Cheri Ryan MD

## 2023-01-01 NOTE — ED TRIAGE NOTES
Patient arrives in wheelchair from home. C/O n/v/abdominal pain, fever, dysuria since this AM.  Tenderness on palpation. A+Ox4. Masked. NAD.

## 2023-01-01 NOTE — ED PROVIDER NOTES
Emergency Department Provider Note                   PCP:                Cory Marcus DO               Age: 63 y.o. Sex: female       ICD-10-CM    1. COVID  U07.1           DISPOSITION Decision To Discharge 01/01/2023 09:12:30 PM        MDM  Number of Diagnoses or Management Options  COVID  Diagnosis management comments: 63-year-old female with nausea vomiting epigastric pain. Due to heart rate 105 temp 108 I did order lactic acid, 1 L bolus CBC chemistry along with a EKG troponin for the initial work-up. Blood pressure is 132/58. She does have what sounds to be flulike symptoms however due to age we will do increased work-up including CT scan and she does have upper abdominal pain. Will receive Zofran, Toradol along with her liter bolus. Patient was found to be COVID-positive. She did get a CT scan did not show any acute abnormalities. Her CBC, CHEM AG troponin were all normal.  I did have a conversation with both her and her  regarding the positive test.  We discussed signs and symptoms of reasons to return. Discussed Tylenol, hydration. The patient was a candidate and did receive prescription for Paxil bed. We discussed reasons to return otherwise what to watch out for at home including obtaining a pulse ox. Patient received prescription for Zofran as well. While in the ER she did receive Zofran and a liter of fluid and felt significantly better. Vital signs were improved upon discharge. She is 99% on room air. Heart rate improved to less than 100 after fluids.   Patient is stable for discharge home                                Orders Placed This Encounter   Procedures    Rapid influenza A/B antigens    COVID-19, Rapid    CT ABDOMEN PELVIS W IV CONTRAST Additional Contrast? None    CBC with Auto Differential    CMP    Lipase    Lactate, Sepsis    Troponin    Urinalysis    EKG 12 Lead        Medications   0.9 % sodium chloride bolus (0 mLs IntraVENous Stopped 1/1/23 2120) ondansetron [facility]) injection 4 mg (4 mg IntraVENous Given 1/1/23 2029)   ketorolac (TORADOL) injection 15 mg (15 mg IntraVENous Given 1/1/23 2029)   iopamidol (ISOVUE-370) 76 % injection 100 mL (100 mLs IntraVENous Given 1/1/23 2038)       Discharge Medication List as of 1/1/2023  9:17 PM        START taking these medications    Details   ondansetron (ZOFRAN-ODT) 4 MG disintegrating tablet Take 1 tablet by mouth 3 times daily as needed for Nausea or Vomiting, Disp-21 tablet, R-0Normal      nirmatrelvir/ritonavir (PAXLOVID, 300/100,) 20 x 150 MG & 10 x 100MG TBPK Take 3 tablets (two 150 mg nirmatrelvir and one 100 mg ritonavir tablets) by mouth every 12 hours for 5 days. , Disp-30 tablet, R-0Normal              Gerry Lyn is a 63 y.o. female who presents to the Emergency Department with chief complaint of    Chief Complaint   Patient presents with    Abdominal Pain      80-year-old female presenting with abdominal pain, nausea vomiting. Symptoms started today. She does have a fever. Denies any shortness of breath or cough. She endorses nausea vomiting. She says she has epigastric pain when she vomits. Denies any change in bowel movements. Has normal urination. She describes the pain is epigastric. Denies any lower abdominal pain or back pain or flank pain. Review of Systems   HENT: Negative. Eyes: Negative. Respiratory: Negative. Cardiovascular: Negative. Gastrointestinal:  Positive for abdominal pain. Genitourinary: Negative. Musculoskeletal: Negative. Skin: Negative. Neurological: Negative. Psychiatric/Behavioral: Negative.        Past Medical History:   Diagnosis Date    Atherosclerosis of native coronary artery without angina pectoris 5/13/2016    Bilateral carotid artery occlusion 5/13/2016    CAD (coronary artery disease) 03/27/2015    stents x 2    Carotid artery stenosis without cerebral infarction 5/13/2016    Chest pain, unspecified 5/13/2016    Chronic obstructive pulmonary disease (HCC)     Coronary atherosclerosis of native coronary vessel 7/25/2016    Depression (emotion)     Diabetes ([facility])     avg- 150- 200- no hypo    WEAVER (dyspnea on exertion)     Dyspnea 5/13/2016    GERD (gastroesophageal reflux disease)     Hypertension     Mixed hyperlipidemia 5/13/2016    Occlusion and stenosis of bilateral carotid arteries 7/25/2016    Peripheral vascular disease ([address] 5/13/2016    SOB (shortness of breath)     Tobacco dependence         Past Surgical History:   Procedure Laterality Date    GYN      LEFT HEART CATH,PERCUTANEOUS  3/26/2015    Xience stent to mLAD, xience stent to mCirc ballooned OM1    PARTIAL HYSTERECTOMY      PTCA      with cardiac stenting    VASCULAR SURGERY  03/06/2019    abd aortic and bilateral iliac PTA and stent placement        Family History   Problem Relation Age of Onset    Hypertension Mother     Diabetes Father     Stroke Mother     Hypertension Other         Family history        Social History     Socioeconomic History    Marital status:    Tobacco Use    Smoking status: Every Day     Packs/day: 1.50     Types: Cigarettes    Smokeless tobacco: Never    Tobacco comments:     Quit smoking: has smoked for 30 to 40 years, currently smokes 1 PPD   Substance and Sexual Activity    Alcohol use: No     Alcohol/week: 0.0 standard drinks    Drug use: No         Patient has no known allergies.      Discharge Medication List as of 1/1/2023  9:17 PM        CONTINUE these medications which have NOT CHANGED    Details   albuterol sulfate  (90 Base) MCG/ACT inhaler Inhale into the lungs every 6 hours as neededHistorical Med      amLODIPine (NORVASC) 10 MG tablet Take 10 mg by mouth dailyHistorical Med      aspirin 81 MG chewable tablet Take 81 mg by mouth dailyHistorical Med      atorvastatin (LIPITOR) 40 MG tablet Take 40 mg by mouth dailyHistorical Med      benazepril (LOTENSIN) 40 MG tablet Take 40 mg by mouth dailyHistorical Med carvedilol (COREG) 25 MG tablet Take 25 mg by mouth 2 times daily (with meals)Historical Med      clopidogrel (PLAVIX) 75 MG tablet Take 75 mg by mouth dailyHistorical Med      fluticasone-salmeterol (ADVAIR) 250-50 MCG/ACT AEPB diskus inhaler Inhale 1 puff into the lungs every 12 hoursHistorical Med      hydroCHLOROthiazide (HYDRODIURIL) 25 MG tablet Take 25 mg by mouth dailyHistorical Med      levalbuterol (XOPENEX HFA) 45 MCG/ACT inhaler Inhale 2 puffs into the lungs every 6 hours as neededHistorical Med      metFORMIN (GLUCOPHAGE) 1000 MG tablet Take 1,000 mg by mouthHistorical Med      nitroGLYCERIN (NITROSTAT) 0.4 MG SL tablet Place 0.4 mg under the tongueHistorical Med      sucralfate (CARAFATE) 1 GM tablet Take 1 g by mouth 4 times dailyHistorical Med              Vitals signs and nursing note reviewed. Patient Vitals for the past 4 hrs:   Temp Pulse Resp BP SpO2   01/01/23 2129 99 °F (37.2 °C) 95 20 130/65 99 %   01/01/23 1852 -- -- 22 -- --   01/01/23 1837 (!) 100.8 °F (38.2 °C) (!) 105 18 (!) 132/58 94 %          Physical Exam  Constitutional:       General: She is not in acute distress. Appearance: Normal appearance. She is not ill-appearing. HENT:      Head: Normocephalic and atraumatic. Nose: No rhinorrhea. Mouth/Throat:      Mouth: Mucous membranes are moist.   Eyes:      Extraocular Movements: Extraocular movements intact. Cardiovascular:      Rate and Rhythm: Regular rhythm. Tachycardia present. Pulmonary:      Effort: Pulmonary effort is normal.      Breath sounds: Normal breath sounds. Abdominal:      General: Abdomen is flat. Bowel sounds are normal. There is no distension. Palpations: Abdomen is soft. Tenderness: There is no abdominal tenderness. Musculoskeletal:         General: Normal range of motion. Cervical back: Normal range of motion. Skin:     General: Skin is warm and dry. Neurological:      General: No focal deficit present.       Mental Status: She is alert. Psychiatric:         Mood and Affect: Mood normal.        Procedures    Results for orders placed or performed during the hospital encounter of 01/01/23   Rapid influenza A/B antigens    Specimen: Nasal Washing   Result Value Ref Range    Influenza A Ag Negative NEG      Influenza B Ag Negative NEG      Source Nasopharyngeal     COVID-19, Rapid    Specimen: Nasopharyngeal   Result Value Ref Range    Source NASAL      SARS-CoV-2, Rapid Detected (AA) NOTD     CT ABDOMEN PELVIS W IV CONTRAST Additional Contrast? None    Narrative    EXAM: CT abdomen and pelvis with IV contrast.    INDICATION: Abdominal pain, fever and dysuria. COMPARISON: Prior CT angiogram of the abdomen on February 25, 2019. TECHNIQUE: Axial CT images of the abdomen and pelvis were obtained after the  intravenous injection of 100 mL Isovue 370 CT contrast. Radiation dose reduction  techniques were used for this study. Our CT scanners use one or all of the  following:  Automated exposure control, adjustment of the mA or kV according to  patient size, iterative reconstruction. FINDINGS:    - Liver: Within normal limits. - Gallbladder and bile ducts: Within normal limits. - Spleen: Within normal limits. - Urinary tract: There is a 4.6 cm cyst in the upper pole of the left kidney,  with a few thin internal septations, as well as a 2 mm left kidney stone. Right  kidney is unremarkable except for an extrarenal pelvis, common variant. No  ureter stone or hydronephrosis is seen. Urinary bladder is within normal limits. - Adrenals: Minimal bilateral adrenal gland thickening is unchanged. - Pancreas: Within normal limits. - Gastrointestinal tract: Within normal limits. No thickened or dilated bowel  loops are seen. The appendix is not clearly visualized. - Retroperitoneum: There is an aortoiliac stent graft, which is diminutive in  caliber evaluation of patency.  The native abdominal aorta measures 2.4 cm in  maximum dimension.  - Peritoneal cavity and abdominal wall: No ascites or free air.  - Pelvis: Posthysterectomy. - Spine/bones: No acute process. - Other comments: The lung bases are clear. Impression    1. No acute process. 2. Left kidney stone. 3. Aortoiliac stent graft. 4. Prior hysterectomy.      CBC with Auto Differential   Result Value Ref Range    WBC 8.0 4.3 - 11.1 K/uL    RBC 4.44 4.05 - 5.2 M/uL    Hemoglobin 14.4 11.7 - 15.4 g/dL    Hematocrit 41.6 35.8 - 46.3 %    MCV 93.7 82 - 102 FL    MCH 32.4 26.1 - 32.9 PG    MCHC 34.6 31.4 - 35.0 g/dL    RDW 12.7 11.9 - 14.6 %    Platelets 005 855 - 766 K/uL    MPV 11.3 9.4 - 12.3 FL    nRBC 0.00 0.0 - 0.2 K/uL    Differential Type AUTOMATED      Seg Neutrophils 85 (H) 43 - 78 %    Lymphocytes 5 (L) 13 - 44 %    Monocytes 9 4.0 - 12.0 %    Eosinophils % 1 0.5 - 7.8 %    Basophils 0 0.0 - 2.0 %    Immature Granulocytes 0 0.0 - 5.0 %    Segs Absolute 6.7 1.7 - 8.2 K/UL    Absolute Lymph # 0.4 (L) 0.5 - 4.6 K/UL    Absolute Mono # 0.7 0.1 - 1.3 K/UL    Absolute Eos # 0.0 0.0 - 0.8 K/UL    Basophils Absolute 0.0 0.0 - 0.2 K/UL    Absolute Immature Granulocyte 0.0 0.0 - 0.5 K/UL   CMP   Result Value Ref Range    Sodium 136 133 - 143 mmol/L    Potassium 3.7 3.5 - 5.1 mmol/L    Chloride 104 101 - 110 mmol/L    CO2 25 21 - 32 mmol/L    Anion Gap 7 2 - 11 mmol/L    Glucose 163 (H) 65 - 100 mg/dL    BUN 11 8 - 23 MG/DL    Creatinine 0.50 (L) 0.6 - 1.0 MG/DL    Est, Glom Filt Rate >60 >60 ml/min/1.73m2    Calcium 8.8 8.3 - 10.4 MG/DL    Total Bilirubin 0.6 0.2 - 1.1 MG/DL    ALT 23 12 - 65 U/L    AST 25 15 - 37 U/L    Alk Phosphatase 91 50 - 136 U/L    Total Protein 6.9 6.3 - 8.2 g/dL    Albumin 3.6 3.2 - 4.6 g/dL    Globulin 3.3 2.8 - 4.5 g/dL    Albumin/Globulin Ratio 1.1 0.4 - 1.6     Lipase   Result Value Ref Range    Lipase 101 73 - 393 U/L   Lactate, Sepsis   Result Value Ref Range    Lactic Acid, Sepsis 1.0 0.4 - 2.0 MMOL/L   Troponin   Result Value Ref Range Troponin, High Sensitivity 6.1 0 - 14 pg/mL   EKG 12 Lead   Result Value Ref Range    Ventricular Rate 104 BPM    Atrial Rate 104 BPM    P-R Interval 132 ms    QRS Duration 78 ms    Q-T Interval 346 ms    QTc Calculation (Bazett) 454 ms    P Axis 75 degrees    R Axis 88 degrees    T Axis 65 degrees    Diagnosis       Sinus tachycardia with occasional Premature ventricular complexes        CT ABDOMEN PELVIS W IV CONTRAST Additional Contrast? None   Final Result   1. No acute process. 2. Left kidney stone. 3. Aortoiliac stent graft. 4. Prior hysterectomy. Voice dictation software was used during the making of this note. This software is not perfect and grammatical and other typographical errors may be present. This note has not been completely proofread for errors.      Teodoro Hagan MD  01/01/23 2844

## 2023-01-02 NOTE — ED NOTES
I have reviewed discharge instructions with the patient. The patient verbalized understanding. Patient left ED via Discharge Method: ambulatory to Home with spouse. Opportunity for questions and clarification provided. Patient given 0 scripts. To continue your aftercare when you leave the hospital, you may receive an automated call from our care team to check in on how you are doing. This is a free service and part of our promise to provide the best care and service to meet your aftercare needs.  If you have questions, or wish to unsubscribe from this service please call [phone]. Thank you for Choosing our [facility] Emergency Department.        Richar Mc RN  01/01/23 9540

## 2023-01-02 NOTE — DISCHARGE INSTRUCTIONS
Quarantine for 5 days. If you are symptom-free can leave quarantine and wear a mask for 5 additional days. If you continue to have symptoms please continue to quarantine. Monitor for respiratory symptoms. If any, shortness of breath, dizzy, chest pain confused or altered mental status please return to the emergency department. Otherwise please obtain a pulse ox monitor to evaluate your oxygenation and heart rate. If you are able to please alternate Tylenol and ibuprofen every 6 hours for the next 2 to 3 days. Stay well-hydrated.

## 2024-02-06 ENCOUNTER — TELEPHONE (OUTPATIENT)
Age: 64
End: 2024-02-06

## 2024-02-06 NOTE — TELEPHONE ENCOUNTER
Pt's  called on behalf of the pt. States the pt has been having BP problems recently. Pt has had some SOB. Denies CP. Pt last seen by Dr. Persaud on 1/5/2022.

## 2024-02-06 NOTE — TELEPHONE ENCOUNTER
Pt has had labile BP recently. Prior to flu diagnosis BP running 88/57, 90/59, 83/55    Within the past week BP has been elevated. This morning BP was 217/99 prior to medication. PCP has been treating BP. PCP recommended pt f/u with Dr. Persaud.    BP after medication was 131/72.    Triage scheduled first available appt with Dr. Persaud 2/21/24 at 11:45 at [facility] and placed pt appt on wait list in the event an earlier appt becomes available. Advised pt if she has problems with BP prior to appt, would recommend she call PCP since she has been managing pt's BP medications most recently. Pt verbalizes understanding and agrees to plan.

## 2024-04-03 ENCOUNTER — OFFICE VISIT (OUTPATIENT)
Age: 64
End: 2024-04-03
Payer: COMMERCIAL

## 2024-04-03 VITALS
DIASTOLIC BLOOD PRESSURE: 90 MMHG | BODY MASS INDEX: 16.42 KG/M2 | HEIGHT: 61 IN | SYSTOLIC BLOOD PRESSURE: 190 MMHG | HEART RATE: 80 BPM | WEIGHT: 87 LBS

## 2024-04-03 DIAGNOSIS — I25.10 CORONARY ARTERY DISEASE INVOLVING NATIVE CORONARY ARTERY OF NATIVE HEART WITHOUT ANGINA PECTORIS: ICD-10-CM

## 2024-04-03 DIAGNOSIS — I1A.0 RESISTANT HYPERTENSION: Primary | ICD-10-CM

## 2024-04-03 DIAGNOSIS — Z72.0 TOBACCO ABUSE: ICD-10-CM

## 2024-04-03 PROCEDURE — 3077F SYST BP >= 140 MM HG: CPT | Performed by: INTERNAL MEDICINE

## 2024-04-03 PROCEDURE — 3080F DIAST BP >= 90 MM HG: CPT | Performed by: INTERNAL MEDICINE

## 2024-04-03 PROCEDURE — 93000 ELECTROCARDIOGRAM COMPLETE: CPT | Performed by: INTERNAL MEDICINE

## 2024-04-03 PROCEDURE — 99214 OFFICE O/P EST MOD 30 MIN: CPT | Performed by: INTERNAL MEDICINE

## 2024-04-03 RX ORDER — VALSARTAN AND HYDROCHLOROTHIAZIDE 320; 25 MG/1; MG/1
1 TABLET, FILM COATED ORAL DAILY
Qty: 90 TABLET | Refills: 3 | Status: SHIPPED | OUTPATIENT
Start: 2024-04-03

## 2024-04-03 RX ORDER — CLONIDINE HYDROCHLORIDE 0.1 MG/1
0.1 TABLET ORAL 2 TIMES DAILY
COMMUNITY

## 2024-04-03 ASSESSMENT — ENCOUNTER SYMPTOMS
BACK PAIN: 0
BLURRED VISION: 0
COUGH: 0
SHORTNESS OF BREATH: 0
ABDOMINAL PAIN: 0
DOUBLE VISION: 0
HEMOPTYSIS: 0
VOMITING: 0
BLOATING: 0
NAUSEA: 0
ORTHOPNEA: 0

## 2024-04-03 NOTE — PROGRESS NOTES
[facility] CARDIOLOGY  [address] [place] [address], [address]  [place] [address]  PHONE: [phone]    24    NAME:  Dian Ramirez  : 1960  MRN: [id number]         SUBJECTIVE:   Dian Ramirez is a 63 y.o. female seen for a visit regarding the following:     Chief Complaint   Patient presents with    Hypertension       HPI:      Prior hx of CAD s/p [facility] in 2015 revealing multivessel CAD requiring stenting to mLAD (2.5x18mm CORRY), mCx (2.5x12 CORRY) and balloon angioplasty to OM1; repeat coronary angiogram with recurrent symptoms with noted patent stents with mild to moderate disease ().  Other history of hypertension. CTA neck revealed 50% on right and no significant on left (carotid US with stated severe rt ICA stenosis prior which triggered test).  Also DM with last A1C 8.3 (). LE dopplers with distal abd/common iliac b/l mod to sev stenosis ().  Subsequent CTA with severe distal aorta/iliac disease status post stent placement [3/6/19].    Not seen since 2022; mostly issues with elevated BPs.  Reports having an episode of dizziness with BP of 52/49 but discussed not consistent.  Otherwise usually with blood pressures elevated.  Has clonidine as needed which she has yet to use.  Denies any chest discomfort.  Denies any claudication symptoms.  Has once again increased smoking and back to about a pack and a half a day.    Prior from visit from 2022-much improved claudication symptoms.    Not seen since 2019.  Prior issues with hypomagnesemia and recommended magnesium supplementation which she has not started.  Last noted potassium on labs from 2021 normal.  Had some atypical chest discomfort ER visit from 2021 with negative work-up.  States transient sharp chest discomfort lasting a second.  No exertional component.  States home blood pressures well controlled and less than 130/80; usually elevated in office.  Prior had CP and SOB before her stents and states her WEAVER significantly

## 2024-04-09 ENCOUNTER — HOSPITAL ENCOUNTER (OUTPATIENT)
Dept: ULTRASOUND IMAGING | Age: 64
Discharge: HOME | End: 2024-04-12
Attending: INTERNAL MEDICINE
Payer: COMMERCIAL

## 2024-04-09 DIAGNOSIS — I1A.0 RESISTANT HYPERTENSION: ICD-10-CM

## 2024-04-09 PROCEDURE — 93975 VASCULAR STUDY: CPT

## 2024-04-09 PROCEDURE — 93975 VASCULAR STUDY: CPT | Performed by: RADIOLOGY

## 2024-06-05 ENCOUNTER — OFFICE VISIT (OUTPATIENT)
Age: 64
End: 2024-06-05
Payer: COMMERCIAL

## 2024-06-05 VITALS
HEART RATE: 84 BPM | SYSTOLIC BLOOD PRESSURE: 198 MMHG | HEIGHT: 61 IN | WEIGHT: 91 LBS | BODY MASS INDEX: 17.18 KG/M2 | DIASTOLIC BLOOD PRESSURE: 94 MMHG

## 2024-06-05 DIAGNOSIS — I25.10 CORONARY ARTERY DISEASE INVOLVING NATIVE CORONARY ARTERY OF NATIVE HEART WITHOUT ANGINA PECTORIS: Primary | ICD-10-CM

## 2024-06-05 DIAGNOSIS — Z72.0 TOBACCO ABUSE: ICD-10-CM

## 2024-06-05 PROCEDURE — 99214 OFFICE O/P EST MOD 30 MIN: CPT | Performed by: INTERNAL MEDICINE

## 2024-06-05 PROCEDURE — 3080F DIAST BP >= 90 MM HG: CPT | Performed by: INTERNAL MEDICINE

## 2024-06-05 PROCEDURE — 3077F SYST BP >= 140 MM HG: CPT | Performed by: INTERNAL MEDICINE

## 2024-06-05 ASSESSMENT — ENCOUNTER SYMPTOMS
BACK PAIN: 0
COUGH: 0
HEMOPTYSIS: 0
VOMITING: 0
SHORTNESS OF BREATH: 0
NAUSEA: 0
ORTHOPNEA: 0
BLOATING: 0
BLURRED VISION: 0
ABDOMINAL PAIN: 0
DOUBLE VISION: 0

## 2024-06-05 NOTE — PROGRESS NOTES
[facility] CARDIOLOGY  [address] [place] [address], [address]  [place] [address]  PHONE: [phone]    24    NAME:  Dian Ramirez  : 1960  MRN: [id number]         SUBJECTIVE:   Dian Ramirez is a 63 y.o. female seen for a visit regarding the following:     Chief Complaint   Patient presents with    Hypertension       HPI:      Prior hx of CAD s/p [facility] in 2015 revealing multivessel CAD requiring stenting to mLAD (2.5x18mm CORRY), mCx (2.5x12 CORRY) and balloon angioplasty to OM1; repeat coronary angiogram with recurrent symptoms with noted patent stents with mild to moderate disease ().  Other history of hypertension. CTA neck revealed 50% on right and no significant on left (carotid US with stated severe rt ICA stenosis prior which triggered test).  Also DM with last A1C 8.3 (). LE dopplers with distal abd/common iliac b/l mod to sev stenosis ().  Subsequent CTA with severe distal aorta/iliac disease status post stent placement [3/6/19].  No evidence of renal artery stenosis [2024]    Much improved blood pressures on review of home logs.  Occasional positional dizziness.  Current antihypertensive regimen includes high-dose valsartan/hydrochlorothiazide, Coreg 25 mg twice daily, Norvasc 10 mg daily.  Appears also prescribed as needed clonidine per PCP which she has not used.    Prior from visit from 2024-not seen since 2022; mostly issues with elevated BPs.  Reports having an episode of dizziness with BP of 52/49 but discussed not consistent.  Otherwise usually with blood pressures elevated.  Has clonidine as needed which she has yet to use.  Denies any chest discomfort.  Denies any claudication symptoms.  Has once again increased smoking and back to about a pack and a half a day.    Prior from visit from 2022-much improved claudication symptoms.    Not seen since 2019.  Prior issues with hypomagnesemia and recommended magnesium supplementation which she has not started.  Last

## 2025-03-05 ENCOUNTER — OFFICE VISIT (OUTPATIENT)
Age: 65
End: 2025-03-05
Payer: COMMERCIAL

## 2025-03-05 VITALS
HEIGHT: 61 IN | SYSTOLIC BLOOD PRESSURE: 172 MMHG | HEART RATE: 60 BPM | BODY MASS INDEX: 18.28 KG/M2 | WEIGHT: 96.8 LBS | DIASTOLIC BLOOD PRESSURE: 86 MMHG

## 2025-03-05 DIAGNOSIS — I10 ESSENTIAL HYPERTENSION: ICD-10-CM

## 2025-03-05 DIAGNOSIS — I25.10 CORONARY ARTERY DISEASE INVOLVING NATIVE CORONARY ARTERY OF NATIVE HEART WITHOUT ANGINA PECTORIS: Primary | ICD-10-CM

## 2025-03-05 DIAGNOSIS — Z72.0 TOBACCO ABUSE: ICD-10-CM

## 2025-03-05 PROCEDURE — 3077F SYST BP >= 140 MM HG: CPT | Performed by: INTERNAL MEDICINE

## 2025-03-05 PROCEDURE — 3079F DIAST BP 80-89 MM HG: CPT | Performed by: INTERNAL MEDICINE

## 2025-03-05 PROCEDURE — 99214 OFFICE O/P EST MOD 30 MIN: CPT | Performed by: INTERNAL MEDICINE

## 2025-03-05 RX ORDER — VALSARTAN AND HYDROCHLOROTHIAZIDE 320; 25 MG/1; MG/1
1 TABLET, FILM COATED ORAL DAILY
Qty: 90 TABLET | Refills: 3 | Status: CANCELLED | OUTPATIENT
Start: 2025-03-05

## 2025-03-05 ASSESSMENT — ENCOUNTER SYMPTOMS
ABDOMINAL PAIN: 0
VOMITING: 0
ORTHOPNEA: 0
COUGH: 0
NAUSEA: 0
BACK PAIN: 0
SHORTNESS OF BREATH: 0
BLURRED VISION: 0
BLOATING: 0
HEMOPTYSIS: 0
DOUBLE VISION: 0

## 2025-03-05 NOTE — PROGRESS NOTES
antiplatelet therapy/statin therapy.       Hypertension-controlled.  Discussed guideline updates with target less than 130/80. Continue Norvasc but decrease dosing to 5 mg daily with some lower symptomatic BPs; continue Coreg 25 mg twice daily.  On high-dose valsartan/hydrochlorothiazide.  Negative renal arterial Dopplers.  Appears usually with elevated BPs in office and well-controlled at home with some intermittent positional dizziness/borderline lows.  Defer any add-on therapy at this time.  In the future, discussed consideration for MRA based on reads. Last Cr ok  Continue to monitor home logs and notify if elevated. Usually prior history of BPs elevated in office      PAD-controlled.  Continue Plavix.  Stressed need for tobacco cessation extensively.     HLP- on a high intensity statin     Tob abuse-not controlled.  Extensively stressed cessation (prior 1.5 ppd and was down to 2-3 per/day); extensively counseled again; states she has cut back and currently using nicotine patches.     Carotid stenosis-prior CTA neck with 50% right and left no significant stenosis. Extensively stressed aggressive risk factor modification     Dyspnea-likely component of COPD     Other-extensively stressed need for follow-up compliance as well as medication compliance.  Aggressive risk factor modification.  Needs aggressive diabetes mellitus control and defer to PCP (last A1c at 7.0 from 3/2024).  Also some prior issues with hypokalemia/hypomag and levels okay on the last labs.    Return in about 9 months (around 12/5/2025).     Sandeep Persaud MD  3/5/2025  3:04 PM